# Patient Record
Sex: FEMALE | Race: BLACK OR AFRICAN AMERICAN | Employment: UNEMPLOYED | URBAN - METROPOLITAN AREA
[De-identification: names, ages, dates, MRNs, and addresses within clinical notes are randomized per-mention and may not be internally consistent; named-entity substitution may affect disease eponyms.]

---

## 2017-01-14 ENCOUNTER — HOSPITAL ENCOUNTER (EMERGENCY)
Facility: HOSPITAL | Age: 27
Discharge: HOME | End: 2017-01-14
Attending: EMERGENCY MEDICINE
Payer: MEDICAID

## 2017-01-14 VITALS
OXYGEN SATURATION: 99 % | DIASTOLIC BLOOD PRESSURE: 78 MMHG | RESPIRATION RATE: 20 BRPM | BODY MASS INDEX: 22.66 KG/M2 | WEIGHT: 141 LBS | SYSTOLIC BLOOD PRESSURE: 134 MMHG | TEMPERATURE: 99 F | HEART RATE: 104 BPM | HEIGHT: 66 IN

## 2017-01-14 DIAGNOSIS — B34.9 VIRAL SYNDROME: Primary | ICD-10-CM

## 2017-01-14 LAB
B-HCG UR QL: NEGATIVE
CTP QC/QA: YES
DEPRECATED S PYO AG THROAT QL EIA: NEGATIVE
FLUAV AG SPEC QL IA: NEGATIVE
FLUBV AG SPEC QL IA: NEGATIVE
SPECIMEN SOURCE: NORMAL

## 2017-01-14 PROCEDURE — 25000003 PHARM REV CODE 250: Performed by: EMERGENCY MEDICINE

## 2017-01-14 PROCEDURE — 99283 EMERGENCY DEPT VISIT LOW MDM: CPT | Mod: 25

## 2017-01-14 PROCEDURE — 81025 URINE PREGNANCY TEST: CPT

## 2017-01-14 PROCEDURE — 87400 INFLUENZA A/B EACH AG IA: CPT

## 2017-01-14 PROCEDURE — 87880 STREP A ASSAY W/OPTIC: CPT

## 2017-01-14 PROCEDURE — 87081 CULTURE SCREEN ONLY: CPT

## 2017-01-14 RX ORDER — IBUPROFEN 400 MG/1
400 TABLET ORAL
Status: COMPLETED | OUTPATIENT
Start: 2017-01-14 | End: 2017-01-14

## 2017-01-14 RX ADMIN — IBUPROFEN 400 MG: 400 TABLET, FILM COATED ORAL at 08:01

## 2017-01-14 NOTE — ED AVS SNAPSHOT
"          [facility]  [address]  [place] [address]               Delmy Garcia   2017  7:18 PM   ED    Description:  Female : 1990   Department:  [facility]           Your Care was Coordinated By:     Provider Role From To    Phillip Tejada Jr., MD Attending Provider [phone] --      Reason for Visit     Nausea     Sore Throat           Diagnoses this Visit        Comments    Viral syndrome    -  Primary       ED Disposition     None           To Do List           Follow-up Information     Please follow up.    Why:  Follow-up with the primary care physician or clinic of choice if not improved      [facility]     [facility] -  Assistance  Registered nurses in the [facility] provide clinical advisement, health education, appointment booking, and other advisory services.  Call for this free service at [phone].             Medications           Message regarding Medications     Verify the changes and/or additions to your medication regime listed below are the same as discussed with your clinician today.  If any of these changes or additions are incorrect, please notify your healthcare provider.        These medications were administered today        Dose Freq    ibuprofen tablet 400 mg 400 mg ED 1 Time    Sig: Take 1 tablet (400 mg total) by mouth ED 1 Time.    Class: Normal    Route: Oral           Verify that the below list of medications is an accurate representation of the medications you are currently taking.  If none reported, the list may be blank. If incorrect, please contact your healthcare provider. Carry this list with you in case of emergency.           Current Medications            Clinical Reference Information           Your Vitals Were     BP Pulse Temp Resp Height Weight    129/69 (Patient Position: Sitting) 99 99.9 °F (37.7 °C) (Oral) 20 5' 6" (1.676 m) 64 kg (141 lb)    Last " "Period SpO2 BMI          01/06/2017 (Approximate) 100% 22.76 kg/m2        Allergies as of 1/14/2017     No Known Allergies      Immunizations Administered on Date of Encounter - 1/14/2017     None      ED Micro, Lab, POCT     Start Ordered       Status Ordering Provider    01/14/17 2037 01/14/17 2036  Influenza antigen Nasal Swab  STAT      Final result     01/14/17 2037 01/14/17 2036  Rapid strep screen  STAT      Final result     01/14/17 2036 01/14/17 2036  Strep A culture, throat  Once      In process     01/14/17 0000 01/14/17 1927  POCT urine pregnancy     Comments:  This order was created through External Result Entry    Completed       ED Imaging Orders     None        Discharge Instructions         Viral Syndrome (Adult)  A viral illness may cause a number of symptoms. The symptoms depend on the part of the body that the virus affects. If it settles in your nose, throat, and lungs, it may cause cough, sore throat, congestion, and sometimes headache. If it settles in your stomach and intestinal tract, it may cause vomiting and diarrhea. Sometimes it causes vague symptoms like "aching all over," feeling tired, loss of appetite, or fever.  A viral illness usually lasts 1 to 2 weeks, but sometimes it lasts longer. In some cases, a more serious infection can look like a viral syndrome in the first few days of the illness. You may need another exam and additional tests to know the difference. Watch for the warning signs listed below.  Home care  Follow these guidelines for taking care of yourself at home:  · If symptoms are severe, rest at home for the first 2 to 3 days.  · Stay away from cigarette smoke - both your smoke and the smoke from others.  · You may use over-the-counter acetaminophen or ibuprofen for fever, muscle aching, and headache, unless another medicine was prescribed for this. If you have chronic liver or kidney disease or ever had a stomach ulcer or GI bleeding, talk with your doctor before " using these medicines. No one who is younger than 18 and ill with a fever should take aspirin. It may cause severe disease or death.  · Your appetite may be poor, so a light diet is fine. Avoid dehydration by drinking 8 to 12 8-ounce glasses of fluids each day. This may include water; orange juice; lemonade; apple, grape, and cranberry juice; clear fruit drinks; electrolyte replacement and sports drinks; and decaffeinated teas and coffee. If you have been diagnosed with a kidney disease, ask your doctor how much and what types of fluids you should drink to prevent dehydration. If you have kidney disease, drinking too much fluid can cause it build up in the your body and be dangerous to your health.  · Over-the-counter remedies won't shorten the length of the illness but may be helpful for cough, sore throat; and nasal and sinus congestion. Don't use decongestants if you have high blood pressure.  Follow-up care  Follow up with your healthcare provider if you do not improve over the next week.  Call 911  Get emergency medical care if any of the following occur:  · Convulsion  · Feeling weak, dizzy, or like you are going to faint  · Chest pain, shortness of breath, wheezing, or difficulty breathing  When to seek medical advice  Call your healthcare provider right away if any of these occur:  · Cough with lots of colored sputum (mucus) or blood in your sputum  · Chest pain, shortness of breath, wheezing, or difficulty breathing  · Severe headache; face, neck, or ear pain  · Severe, constant pain in the lower right side of your belly (abdominal)  · Continued vomiting (cant keep liquids down)  · Frequent diarrhea (more than 5 times a day); blood (red or black color) or mucus in diarrhea  · Feeling weak, dizzy, or like you are going to faint  · Extreme thirst  · Fever of 100.4°F (38°C) or higher, or as directed by your healthcare provider  © 8626-7200 [employer]. [address] [place] [address], [place] [address].  All rights reserved. This information is not intended as a substitute for professional medical care. Always follow your healthcare professional's instructions.          [facility] Sign-Up     Activating your [facility] account is as easy as 1-2-3!     1) Visit [email or web address], select Sign Up Now, enter this activation code and your date of birth, then select Next.  [email or web address]  Expires: 2/28/2017  9:35 PM      2) Create a username and password to use when you visit [facility] in the future and select a security question in case you lose your password and select Next.    3) Enter your e-mail address and click Sign Up!    Additional Information  If you have questions, please e-mail [email or web address] or call [phone] to talk to our [facility] staff. Remember, [facility] is NOT to be used for urgent needs. For medical emergencies, dial 911.          [facility] complies with applicable Federal civil rights laws and does not discriminate on the basis of race, color, national origin, age, disability, or sex.        Language Assistance Services     ATTENTION: Language assistance services are available, free of charge. Please call [phone].      ATENCIÓN: Si habla ashleigh, tiene a shaver disposición servicios gratuitos de asistencia lingüística. Llame al [phone].     CHÚ Ý: N?u b?n nói Ti?ng Vi?t, có các d?ch v? h? tr? ngôn ng? mi?n phí dành cho b?n. G?i s? [phone].

## 2017-01-15 NOTE — DISCHARGE INSTRUCTIONS
"  Viral Syndrome (Adult)  A viral illness may cause a number of symptoms. The symptoms depend on the part of the body that the virus affects. If it settles in your nose, throat, and lungs, it may cause cough, sore throat, congestion, and sometimes headache. If it settles in your stomach and intestinal tract, it may cause vomiting and diarrhea. Sometimes it causes vague symptoms like "aching all over," feeling tired, loss of appetite, or fever.  A viral illness usually lasts 1 to 2 weeks, but sometimes it lasts longer. In some cases, a more serious infection can look like a viral syndrome in the first few days of the illness. You may need another exam and additional tests to know the difference. Watch for the warning signs listed below.  Home care  Follow these guidelines for taking care of yourself at home:  · If symptoms are severe, rest at home for the first 2 to 3 days.  · Stay away from cigarette smoke - both your smoke and the smoke from others.  · You may use over-the-counter acetaminophen or ibuprofen for fever, muscle aching, and headache, unless another medicine was prescribed for this. If you have chronic liver or kidney disease or ever had a stomach ulcer or GI bleeding, talk with your doctor before using these medicines. No one who is younger than 18 and ill with a fever should take aspirin. It may cause severe disease or death.  · Your appetite may be poor, so a light diet is fine. Avoid dehydration by drinking 8 to 12 8-ounce glasses of fluids each day. This may include water; orange juice; lemonade; apple, grape, and cranberry juice; clear fruit drinks; electrolyte replacement and sports drinks; and decaffeinated teas and coffee. If you have been diagnosed with a kidney disease, ask your doctor how much and what types of fluids you should drink to prevent dehydration. If you have kidney disease, drinking too much fluid can cause it build up in the your body and be dangerous to your " health.  · Over-the-counter remedies won't shorten the length of the illness but may be helpful for cough, sore throat; and nasal and sinus congestion. Don't use decongestants if you have high blood pressure.  Follow-up care  Follow up with your healthcare provider if you do not improve over the next week.  Call 911  Get emergency medical care if any of the following occur:  · Convulsion  · Feeling weak, dizzy, or like you are going to faint  · Chest pain, shortness of breath, wheezing, or difficulty breathing  When to seek medical advice  Call your healthcare provider right away if any of these occur:  · Cough with lots of colored sputum (mucus) or blood in your sputum  · Chest pain, shortness of breath, wheezing, or difficulty breathing  · Severe headache; face, neck, or ear pain  · Severe, constant pain in the lower right side of your belly (abdominal)  · Continued vomiting (cant keep liquids down)  · Frequent diarrhea (more than 5 times a day); blood (red or black color) or mucus in diarrhea  · Feeling weak, dizzy, or like you are going to faint  · Extreme thirst  · Fever of 100.4°F (38°C) or higher, or as directed by your healthcare provider  © 1205-4874 [employer]. [address] [place] [address], [place] [address]. All rights reserved. This information is not intended as a substitute for professional medical care. Always follow your healthcare professional's instructions.

## 2017-01-15 NOTE — ED PROVIDER NOTES
Encounter Date: 1/14/2017       History     Chief Complaint   Patient presents with    Nausea     pt ambulatory to triage and reports body aches and feels warm and has sore throat and nausea; pt reports took some theraflu earlier today    Sore Throat     Review of patient's allergies indicates:  No Known Allergies  HPI Comments: Delmy Garcia, a 26 y.o. female, complains of otherwise body aches sore throat and subjective fever that began early this morning.  She said that she vomited one time complains of mild congestion.  She said she took calls and TheraFlu without improvement.  Pain location: Body aches and sore throat  Pain Severity: Mild-to-moderate    Pain timing: Less than 24 hours  Pain character: Aching muscles, stinging throat  Associated with or Modified by: (see above)        History reviewed. No pertinent past medical history.  No past medical history pertinent negatives.  History reviewed. No pertinent past surgical history.  No family history on file.  Social History   Substance Use Topics    Smoking status: Never Smoker    Smokeless tobacco: None    Alcohol use None     Review of Systems   HENT: Positive for congestion and sore throat.    Respiratory: Positive for cough.    Musculoskeletal: Positive for myalgias.   All other systems reviewed and are negative.      Physical Exam   Initial Vitals   BP Pulse Resp Temp SpO2   01/14/17 1916 01/14/17 1916 01/14/17 1916 01/14/17 1916 01/14/17 1916   129/69 99 20 99.9 °F (37.7 °C) 100 %     Physical Exam    Nursing note and vitals reviewed.  Constitutional: She appears well-developed and well-nourished. No distress.   HENT:   Right Ear: External ear normal.   Left Ear: External ear normal.   Tympanic membranes are normal bilaterally.  The pharynx is mildly injected but there are no exudates.  The airway is widely patent.   Eyes: Conjunctivae and EOM are normal. Pupils are equal, round, and reactive to light.   Neck: Normal range of motion. Neck  supple.   Cardiovascular: Normal rate, regular rhythm and normal heart sounds.   Pulmonary/Chest: Breath sounds normal.   Abdominal: Soft.   Musculoskeletal: Normal range of motion.   Neurological: She is alert and oriented to person, place, and time.   Skin: Skin is warm.   Psychiatric: She has a normal mood and affect. Her behavior is normal. Thought content normal.         ED Course   Procedures  Labs Reviewed   THROAT SCREEN, RAPID   CULTURE, STREP A,  THROAT   INFLUENZA A AND B ANTIGEN             Medical Decision Making:   Initial Assessment:   26 y.o. female, complains of otherwise body aches sore throat and subjective fever that began early this morning.  She said that she vomited one time complains of mild congestion.  She said she took calls and TheraFlu without improvement.  Pain location: Body aches and sore throat  Pain Severity: Mild-to-moderate    Pain timing: Less than 24 hours    PE: Unremarkable physical examination  ED Management:  Flu and strep studies were negative.  The patient will be treated with over-the-counter medication for nonspecific URI/viral syndrome.  She may follow-up with her primary care physician or clinic of choice if not improved                   ED Course     Clinical Impression:   The encounter diagnosis was Viral syndrome.          Phillip Tejada Jr., MD  01/14/17 2136       Phillip Tejada Jr., MD  01/14/17 2137

## 2017-01-15 NOTE — ED TRIAGE NOTES
Pt presents to ED c/o body aches, itchy throat, nausea, and chills x 1 day. Started this am. Pt took theraflu approx 6 hours ago with no relief. Pt states she attempted to eat chicken noodle soup but vomited right after. No urinary complaints. No diarrhea. Pt does reports dry cough.

## 2017-01-17 LAB — BACTERIA THROAT CULT: NORMAL

## 2019-02-19 ENCOUNTER — HOSPITAL ENCOUNTER (EMERGENCY)
Facility: HOSPITAL | Age: 29
Discharge: HOME | End: 2019-02-19
Attending: EMERGENCY MEDICINE
Payer: COMMERCIAL

## 2019-02-19 VITALS
BODY MASS INDEX: 21.53 KG/M2 | WEIGHT: 134 LBS | DIASTOLIC BLOOD PRESSURE: 87 MMHG | RESPIRATION RATE: 18 BRPM | HEART RATE: 74 BPM | HEIGHT: 66 IN | OXYGEN SATURATION: 100 % | TEMPERATURE: 98 F | SYSTOLIC BLOOD PRESSURE: 135 MMHG

## 2019-02-19 DIAGNOSIS — S39.012A BACK STRAIN, INITIAL ENCOUNTER: Primary | ICD-10-CM
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
## 2019-02-19 LAB
B-HCG UR QL: NEGATIVE
CTP QC/QA: YES

## 2019-02-19 PROCEDURE — 81025 URINE PREGNANCY TEST: CPT | Performed by: PHYSICIAN ASSISTANT

## 2019-02-19 PROCEDURE — 99283 EMERGENCY DEPT VISIT LOW MDM: CPT

## 2019-02-19 RX ORDER — METHOCARBAMOL 750 MG/1
1500 TABLET, FILM COATED ORAL 3 TIMES DAILY
Qty: 30 TABLET | Refills: 0 | Status: SHIPPED | OUTPATIENT
Start: 2019-02-19 | End: 2019-02-24

## 2019-02-19 RX ORDER — KETOROLAC TROMETHAMINE 10 MG/1
10 TABLET, FILM COATED ORAL EVERY 6 HOURS
Qty: 12 TABLET | Refills: 0 | Status: SHIPPED | OUTPATIENT
Start: 2019-02-19 | End: 2019-02-22
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
## 2020-02-18 ENCOUNTER — DOCUMENTATION ONLY (OUTPATIENT)
Dept: URGENT CARE | Facility: CLINIC | Age: 30
End: 2020-02-18

## 2020-02-18 ENCOUNTER — OFFICE VISIT (OUTPATIENT)
Dept: URGENT CARE | Facility: CLINIC | Age: 30
End: 2020-02-18
Payer: COMMERCIAL

## 2020-02-18 VITALS
WEIGHT: 134.06 LBS | SYSTOLIC BLOOD PRESSURE: 144 MMHG | HEIGHT: 66 IN | BODY MASS INDEX: 21.55 KG/M2 | TEMPERATURE: 99 F | OXYGEN SATURATION: 100 % | RESPIRATION RATE: 18 BRPM | DIASTOLIC BLOOD PRESSURE: 82 MMHG

## 2020-02-18 DIAGNOSIS — M25.561 ACUTE PAIN OF RIGHT KNEE: ICD-10-CM

## 2020-02-18 DIAGNOSIS — S80.01XA CONTUSION OF RIGHT KNEE, INITIAL ENCOUNTER: ICD-10-CM
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
## 2020-02-18 PROCEDURE — 73562 X-RAY EXAM OF KNEE 3: CPT | Mod: RT,S$GLB,, | Performed by: RADIOLOGY

## 2020-02-18 PROCEDURE — 73562 XR KNEE 3 VIEW RIGHT: ICD-10-PCS | Mod: RT,S$GLB,, | Performed by: RADIOLOGY

## 2020-02-18 PROCEDURE — 99204 OFFICE O/P NEW MOD 45 MIN: CPT | Mod: S$GLB,,, | Performed by: NURSE PRACTITIONER

## 2020-02-18 PROCEDURE — 99204 PR OFFICE/OUTPT VISIT, NEW, LEVL IV, 45-59 MIN: ICD-10-PCS | Mod: S$GLB,,, | Performed by: NURSE PRACTITIONER

## 2020-02-18 RX ORDER — DEXTROMETHORPHAN HYDROBROMIDE, GUAIFENESIN 5; 100 MG/5ML; MG/5ML
650 LIQUID ORAL EVERY 8 HOURS
Refills: 0 | COMMUNITY
Start: 2020-02-18 | End: 2020-03-10 | Stop reason: SDUPTHER

## 2020-02-18 RX ORDER — IBUPROFEN 200 MG
400 TABLET ORAL EVERY 6 HOURS PRN
Refills: 0 | COMMUNITY
Start: 2020-02-18 | End: 2020-03-17 | Stop reason: ALTCHOICE

## 2020-02-18 NOTE — LETTER
[facility] [address] [address]  Phone: [phone]  Fax: [phone]  [facility]: [email or web address]    Pt Name: Delmy Garcia  Injury Date: 02/13/2020    Date of First Treatment: 02/18/2020   Company: [employer]      Appointment Time: 10:20 AM Arrived: 1030am   Provider: Norma King NP Time Out:1215pm     Office Treatment:   1. Work related injury    2. Acute pain of right knee    3. Contusion of right knee, initial encounter      Medications Ordered This Encounter   Medications    acetaminophen (TYLENOL) 650 MG TbSR    ibuprofen (ADVIL,MOTRIN) 200 MG tablet      Patient Instructions: Attention not to aggravate affected area, Daily home exercises/warm soaks, Apply ice 24-48 hours then apply heat/warm soaks, Elevated affected area(Please take Tylenol or ibuprofen as directed for pain and discomfort on the bottle)    Restrictions: Sit down work only, No driving company vehicles(02/18/2020)     Return Appointment: 02/24/2020 at 10am

## 2020-02-18 NOTE — LETTER
[facility] [address] [address]  Phone: [phone]  Fax: [phone]  [facility]: [email or web address]    Pt Name: Delmy Garcia  Injury Date: 02/13/2020   Employee ID:[id number] Date of Treatment: 02/18/2020   Company: [employer]      Appointment Time: 10:20 AM Arrived: 10:30 AM   Provider: Norma King NP Time Out:12:15 PM     Office Treatment:   1. Work related injury    2. Acute pain of right knee    3. Contusion of right knee, initial encounter      Medications Ordered This Encounter   Medications    acetaminophen (TYLENOL) 650 MG TbSR    ibuprofen (ADVIL,MOTRIN) 200 MG tablet      Patient Instructions: Attention not to aggravate affected area, Daily home exercises/warm soaks, Apply ice 24-48 hours then apply heat/warm soaks, Elevated affected area(Please take Tylenol or ibuprofen as directed for pain and discomfort on the bottle)    Restrictions: Sit down work only, No driving company vehicles(02/18/2020)     Return Appointment: 2/24/2020 at 10:00 AM

## 2020-02-18 NOTE — PROGRESS NOTES
Subjective:       Patient ID: Delmy Garcia is a 29 y.o. female.    Chief Complaint: Work Related Injury    Patient here today with c/o while at work on Thursday 2/13/20 she was getting out of a car and felt Right knee pain. States it felt better after resting it over the weekend but became worse again yesterday after walking around while at work all day.    Knee Injury   This is a new problem. The current episode started in the past 7 days. The problem occurs intermittently. The problem has been unchanged. Associated symptoms include arthralgias and joint swelling. Pertinent negatives include no abdominal pain, anorexia, change in bowel habit, chest pain, chills, congestion, coughing, fatigue, fever, headaches, myalgias, nausea, neck pain, numbness, rash, sore throat, swollen glands, urinary symptoms, vertigo, visual change, vomiting or weakness. The symptoms are aggravated by walking. She has tried nothing for the symptoms. The treatment provided no relief.       Constitution: Negative for chills, fatigue and fever.   HENT: Negative for facial swelling, facial trauma, congestion and sore throat.    Neck: Negative for neck pain and neck stiffness.   Cardiovascular: Negative for chest trauma and chest pain.   Eyes: Negative for eye trauma, double vision and blurred vision.   Respiratory: Negative for cough.    Gastrointestinal: Negative for abdominal trauma, abdominal pain, nausea, vomiting and rectal bleeding.   Genitourinary: Negative for hematuria, missed menses, genital trauma and pelvic pain.   Musculoskeletal: Positive for joint pain and joint swelling. Negative for pain, trauma, abnormal ROM of joint and muscle ache.   Skin: Negative for color change, rash, wound, abrasion, laceration, erythema and bruising.   Neurological: Negative for dizziness, history of vertigo, light-headedness, coordination disturbances, headaches, altered mental status, loss of consciousness and numbness.   Hematologic/Lymphatic:  Negative for history of bleeding disorder.   Psychiatric/Behavioral: Negative for altered mental status.        Objective:      Physical Exam   Constitutional: She is oriented to person, place, and time. She appears well-developed and well-nourished.  Non-toxic appearance. She does not have a sickly appearance. She does not appear ill. No distress.   Elevated BP in the clinic   HENT:   Head: Normocephalic and atraumatic. Head is without abrasion, without contusion and without laceration.   Right Ear: External ear normal.   Left Ear: External ear normal.   Nose: Nose normal.   Eyes: Pupils are equal, round, and reactive to light. Conjunctivae, EOM and lids are normal.   Neck: Trachea normal, full passive range of motion without pain and phonation normal. Neck supple.   Cardiovascular: Normal rate and intact distal pulses.   Pulses:       Dorsalis pedis pulses are 2+ on the right side.        Posterior tibial pulses are 2+ on the right side.   Pulmonary/Chest: Effort normal and breath sounds normal. No stridor. No respiratory distress.   Musculoskeletal: Normal range of motion. She exhibits tenderness.        Right knee: She exhibits swelling and abnormal patellar mobility. She exhibits normal range of motion. Tenderness found.        Legs:  Neurological: She is alert and oriented to person, place, and time.   Skin: Skin is warm, dry and intact. Capillary refill takes less than 2 seconds. No abrasion, no bruising, no burn, no ecchymosis, no laceration, no lesion and no rash noted. No erythema.   Psychiatric: She has a normal mood and affect. Her speech is normal and behavior is normal. Judgment and thought content normal. Cognition and memory are normal.   Nursing note and vitals reviewed.    Xr Knee 3 View Right    Result Date: 2/18/2020  EXAMINATION: XR KNEE 3 VIEW RIGHT CLINICAL HISTORY: Pain in right knee FINDINGS: Three views right: No fracture, dislocation bone destruction or OCD seen. Electronically signed  by: Eze Herrera MD Date:    02/18/2020 Time:    11:57  Assessment:       1. Work related injury    2. Acute pain of right knee    3. Contusion of right knee, initial encounter        Plan:         Medications Ordered This Encounter   Medications    acetaminophen (TYLENOL) 650 MG TbSR     Sig: Take 1 tablet (650 mg total) by mouth every 8 (eight) hours.     Refill:  0    ibuprofen (ADVIL,MOTRIN) 200 MG tablet     Sig: Take 2 tablets (400 mg total) by mouth every 6 (six) hours as needed for Pain.     Refill:  0     Patient Instructions: Attention not to aggravate affected area, Daily home exercises/warm soaks, Apply ice 24-48 hours then apply heat/warm soaks, Elevated affected area(Please take Tylenol or ibuprofen as directed for pain and discomfort on the bottle)   Restrictions: Sit down work only, No driving company vehicles(02/18/2020)  Follow up in about 6 days (around 2/24/2020).

## 2020-02-24 ENCOUNTER — OFFICE VISIT (OUTPATIENT)
Dept: URGENT CARE | Facility: CLINIC | Age: 30
End: 2020-02-24
Payer: COMMERCIAL

## 2020-02-24 VITALS
BODY MASS INDEX: 21.62 KG/M2 | HEIGHT: 66 IN | SYSTOLIC BLOOD PRESSURE: 125 MMHG | WEIGHT: 134.5 LBS | DIASTOLIC BLOOD PRESSURE: 93 MMHG | OXYGEN SATURATION: 100 % | RESPIRATION RATE: 20 BRPM | TEMPERATURE: 98 F | HEART RATE: 60 BPM

## 2020-02-24 DIAGNOSIS — M25.561 ACUTE PAIN OF RIGHT KNEE: Primary | ICD-10-CM
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
## 2020-02-24 DIAGNOSIS — S80.01XA CONTUSION OF RIGHT KNEE, INITIAL ENCOUNTER: ICD-10-CM

## 2020-02-24 PROCEDURE — 99214 OFFICE O/P EST MOD 30 MIN: CPT | Mod: S$GLB,,, | Performed by: NURSE PRACTITIONER

## 2020-02-24 PROCEDURE — 99214 PR OFFICE/OUTPT VISIT, EST, LEVL IV, 30-39 MIN: ICD-10-PCS | Mod: S$GLB,,, | Performed by: NURSE PRACTITIONER

## 2020-02-24 NOTE — LETTER
[facility] [address] [address]  Phone: [phone]  Fax: [phone]  [facility]: [id number]    Pt Name: Delmy Garcia  Injury Date: 02/13/2020   Employee ID: [id number] Date of  Treatment: 02/24/2020   Company: [employer]      Appointment Time: 09:45 AM Arrived: 10:00am   Provider: KRISTI Gan Time Out:11:05am     Office Treatment:   1. Acute pain of right knee    2. Contusion of right knee, initial encounter    3. Work related injury          Patient Instructions: Attention not to aggravate affected area, Elevated affected area    Restrictions: Sit down work only     Return Appointment: 3/2/2020 at 11:00am

## 2020-02-24 NOTE — PROGRESS NOTES
"Subjective:       Patient ID: Delmy Garcia is a 29 y.o. female.    Vitals:  height is 5' 6" (1.676 m) and weight is 61 kg (134 lb 7.7 oz). Her temperature is 97.9 °F (36.6 °C). Her blood pressure is 125/93 (abnormal) and her pulse is 60. Her respiration is 20 and oxygen saturation is 100%.     Chief Complaint: Work Related Injury    Ambulatory f/u right knee injury. Patient states she was getting out of a vehicle and hit her right knee.she went back to work and her knee kept swelling. She has not been back to work since then.. She says she is still experiencing pain when walking to knee but swelling has improved.     Knee Injury   This is a new problem. The current episode started 1 to 4 weeks ago. The problem occurs constantly. The problem has been gradually improving. Pertinent negatives include no arthralgias, chest pain, chills, congestion, coughing, fatigue, fever, headaches, joint swelling, myalgias, nausea, rash, sore throat, vertigo, vomiting or weakness. Nothing aggravates the symptoms. She has tried NSAIDs and immobilization for the symptoms.       Constitution: Negative for chills, fatigue and fever.   HENT: Negative for congestion and sore throat.    Neck: Negative for painful lymph nodes.   Cardiovascular: Negative for chest pain and leg swelling.   Eyes: Negative for double vision and blurred vision.   Respiratory: Negative for cough and shortness of breath.    Gastrointestinal: Negative for nausea, vomiting and diarrhea.   Genitourinary: Negative for dysuria, frequency, urgency and history of kidney stones.   Musculoskeletal: Negative for joint pain, joint swelling, muscle cramps and muscle ache.   Skin: Negative for color change, pale, rash, erythema and bruising.   Allergic/Immunologic: Negative for seasonal allergies.   Neurological: Negative for dizziness, history of vertigo, light-headedness, passing out and headaches.   Hematologic/Lymphatic: Negative for swollen lymph nodes. "   Psychiatric/Behavioral: Negative for nervous/anxious, sleep disturbance and depression. The patient is not nervous/anxious.        Objective:      Physical Exam   Constitutional: She is oriented to person, place, and time. She appears well-developed and well-nourished.  Non-toxic appearance. She does not have a sickly appearance. She does not appear ill. No distress.   Elevated BP in the clinic   HENT:   Head: Normocephalic and atraumatic. Head is without abrasion, without contusion and without laceration.   Right Ear: External ear normal.   Left Ear: External ear normal.   Nose: Nose normal.   Eyes: Pupils are equal, round, and reactive to light. Conjunctivae, EOM and lids are normal.   Neck: Trachea normal, full passive range of motion without pain and phonation normal. Neck supple.   Cardiovascular: Normal rate and intact distal pulses.   Pulses:       Dorsalis pedis pulses are 2+ on the right side.        Posterior tibial pulses are 2+ on the right side.   Pulmonary/Chest: Effort normal and breath sounds normal. No stridor. No respiratory distress.   Musculoskeletal: Normal range of motion. She exhibits tenderness.        Right knee: She exhibits swelling. She exhibits normal range of motion and normal patellar mobility. Tenderness found.        Legs:  Neurological: She is alert and oriented to person, place, and time.   Skin: Skin is warm, dry, intact and no rash. Capillary refill takes less than 2 seconds. abrasion, burn, bruising, erythema, ecchymosis and lesion  Psychiatric: She has a normal mood and affect. Her speech is normal and behavior is normal. Judgment and thought content normal. Cognition and memory are normal.   Nursing note and vitals reviewed.        Assessment:       1. Acute pain of right knee    2. Contusion of right knee, initial encounter    3. Work related injury        Plan:         Acute pain of right knee  -     BANDAGE ELASTIC 4IN ACE NS    Contusion of right knee, initial  encounter  -     BANDAGE ELASTIC 4IN ACE NS    Work related injury    follow up 3/2/2020  elevate extremity; ice to area, ace wrap  Light duty- sit down work only

## 2020-02-24 NOTE — LETTER
[facility] [address] [address]  Phone: [phone]  Fax: [phone]  [facility]: [id number]    Pt Name: Delmy Garcia  Injury Date: 02/13/2020   Employee ID: [id number] Date of First Treatment: 02/24/2020   Company: [employer]      Appointment Time: 09:45 AM Arrived: 1000   Provider: KRISTI Gan Time Out:1105     Office Treatment:   1. Acute pain of right knee    2. Contusion of right knee, initial encounter    3. Work related injury          Patient Instructions: Attention not to aggravate affected area, Elevated affected area    Restrictions: Sit down work only     Return Appointment: Visit date 03/02/2020 at 1100 am

## 2020-03-02 ENCOUNTER — OFFICE VISIT (OUTPATIENT)
Dept: URGENT CARE | Facility: CLINIC | Age: 30
End: 2020-03-02
Payer: COMMERCIAL

## 2020-03-02 VITALS
HEIGHT: 66 IN | RESPIRATION RATE: 16 BRPM | OXYGEN SATURATION: 100 % | WEIGHT: 134 LBS | BODY MASS INDEX: 21.53 KG/M2 | DIASTOLIC BLOOD PRESSURE: 74 MMHG | HEART RATE: 60 BPM | SYSTOLIC BLOOD PRESSURE: 119 MMHG
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
## 2020-03-02 DIAGNOSIS — S83.411D SPRAIN OF MEDIAL COLLATERAL LIGAMENT OF RIGHT KNEE, SUBSEQUENT ENCOUNTER: ICD-10-CM

## 2020-03-02 DIAGNOSIS — M25.561 ACUTE PAIN OF RIGHT KNEE: ICD-10-CM

## 2020-03-02 DIAGNOSIS — S80.01XD CONTUSION OF RIGHT KNEE, SUBSEQUENT ENCOUNTER: ICD-10-CM

## 2020-03-02 PROCEDURE — 99214 PR OFFICE/OUTPT VISIT, EST, LEVL IV, 30-39 MIN: ICD-10-PCS | Mod: S$GLB,,, | Performed by: NURSE PRACTITIONER

## 2020-03-02 PROCEDURE — 99214 OFFICE O/P EST MOD 30 MIN: CPT | Mod: S$GLB,,, | Performed by: NURSE PRACTITIONER

## 2020-03-02 RX ORDER — METHOCARBAMOL 750 MG/1
750 TABLET, FILM COATED ORAL 4 TIMES DAILY PRN
Qty: 28 TABLET | Refills: 0 | Status: SHIPPED | OUTPATIENT
Start: 2020-03-02 | End: 2020-03-09

## 2020-03-02 RX ORDER — ETODOLAC 400 MG/1
400 TABLET, FILM COATED ORAL 2 TIMES DAILY
Qty: 30 TABLET | Refills: 0 | Status: SHIPPED | OUTPATIENT
Start: 2020-03-02 | End: 2020-03-17

## 2020-03-02 NOTE — LETTER
[facility] [address] [address]  Phone: [phone]  Fax: [phone]  [facility]: [email or web address]    Pt Name: Delmy Garcia  Injury Date: 02/13/2020   Employee ID: [id number] Date of  Treatment: 03/02/2020   Company: [employer]      Appointment Time: 10:45 AM Arrived: 10:37 am    Provider: Norma King NP Time Out: 12:00pm     Office Treatment:   1. Work related injury    2. Sprain of medial collateral ligament of right knee, subsequent encounter    3. Contusion of right knee, subsequent encounter    4. Acute pain of right knee      Medications Ordered This Encounter   Medications    etodolac (LODINE) 400 MG tablet    methocarbamol (ROBAXIN) 750 MG Tab      Patient Instructions: Attention not to aggravate affected area, Apply ice 24-48 hours then apply heat/warm soaks, Elevated affected area, MRI to be scheduled once authorized, Use splint as directed(Please take medication as directed for pain and discomfort)    Restrictions: Sit down work only, No driving company vehicles(03/02/2020)     Return Appointment: 3/10/2020 at 8:15am

## 2020-03-02 NOTE — LETTER
[facility] [address] [address]  Phone: [phone]  Fax: [phone]  [facility]: [email or web address]    Pt Name: Delmy Garcia  Injury Date: 02/13/2020    Date of First Treatment: 03/02/2020   Company: [employer]      Appointment Time: 10:45 AM Arrived: 1037am   Provider: Norma King NP Time Out:12pm     Office Treatment:   1. Work related injury    2. Sprain of medial collateral ligament of right knee, subsequent encounter    3. Contusion of right knee, subsequent encounter    4. Acute pain of right knee      Medications Ordered This Encounter   Medications    etodolac (LODINE) 400 MG tablet    methocarbamol (ROBAXIN) 750 MG Tab      Patient Instructions: Attention not to aggravate affected area, Apply ice 24-48 hours then apply heat/warm soaks, Elevated affected area, MRI to be scheduled once authorized, Use splint as directed(Please take medication as directed for pain and discomfort)    Restrictions: Sit down work only, No driving company vehicles(03/02/2020)     Return Appointment: 03/10/2020 at 815am

## 2020-03-02 NOTE — PROGRESS NOTES
Subjective:       Patient ID: Delmy Garcia is a 29 y.o. female.    Chief Complaint: Knee Injury    Patient states that the knee is still hurting and swelling.  States that it keeps popping.  States that Tylenol and ibuprofen are not working.    Knee Injury   This is a new problem. The current episode started 1 to 4 weeks ago (x3 wks ). The problem occurs constantly. The problem has been unchanged. Associated symptoms include arthralgias and joint swelling. Pertinent negatives include no abdominal pain, fatigue, vertigo or weakness. The symptoms are aggravated by bending, walking, standing and twisting. She has tried ice, acetaminophen, NSAIDs and rest (elevation ) for the symptoms. The treatment provided no relief.       Constitution: Negative for fatigue.   HENT: Negative for facial swelling and facial trauma.    Neck: Negative for neck stiffness.   Cardiovascular: Negative for chest trauma.   Eyes: Negative for eye trauma, double vision and blurred vision.   Gastrointestinal: Negative for abdominal trauma, abdominal pain and rectal bleeding.   Genitourinary: Negative for hematuria, missed menses, genital trauma and pelvic pain.   Musculoskeletal: Positive for pain, trauma, joint pain and joint swelling. Negative for abnormal ROM of joint.   Skin: Negative for color change, wound, abrasion, laceration, erythema and bruising.   Neurological: Negative for dizziness, history of vertigo, light-headedness, coordination disturbances, altered mental status and loss of consciousness.   Hematologic/Lymphatic: Negative for history of bleeding disorder.   Psychiatric/Behavioral: Negative for altered mental status.        Objective:      Physical Exam   Constitutional: She is oriented to person, place, and time. She appears well-developed and well-nourished.  Non-toxic appearance. She does not have a sickly appearance. She does not appear ill. No distress.   Elevated BP in the clinic   HENT:   Head: Normocephalic and  atraumatic. Head is without abrasion, without contusion and without laceration.   Right Ear: External ear normal.   Left Ear: External ear normal.   Nose: Nose normal.   Eyes: Pupils are equal, round, and reactive to light. Conjunctivae, EOM and lids are normal.   Neck: Trachea normal, full passive range of motion without pain and phonation normal. Neck supple.   Cardiovascular: Normal rate and intact distal pulses.   Pulses:       Dorsalis pedis pulses are 2+ on the right side.        Posterior tibial pulses are 2+ on the right side.   Pulmonary/Chest: Effort normal and breath sounds normal. No stridor. No respiratory distress.   Musculoskeletal: Normal range of motion. She exhibits tenderness.        Right knee: She exhibits swelling. She exhibits normal range of motion and normal patellar mobility. Tenderness found.        Legs:  Patient is unable to fully extend the leg secondary to pain with extension.  Pain with flexion starts at 90°.  Patient is noted to be favoring the right knee and bearing most of her weight on the left leg.  Antalgic gait noted.   Neurological: She is alert and oriented to person, place, and time.   Skin: Skin is warm, dry and intact. Capillary refill takes less than 2 seconds. No abrasion, no bruising, no burn, no ecchymosis, no lesion and no rash noted. No erythema.   Psychiatric: She has a normal mood and affect. Her speech is normal and behavior is normal. Judgment and thought content normal. Cognition and memory are normal.   Nursing note and vitals reviewed.      Assessment:       1. Work related injury    2. Sprain of medial collateral ligament of right knee, subsequent encounter    3. Contusion of right knee, subsequent encounter    4. Acute pain of right knee        Plan:         Medications Ordered This Encounter   Medications    etodolac (LODINE) 400 MG tablet     Sig: Take 1 tablet (400 mg total) by mouth 2 (two) times daily. for 15 days     Dispense:  30 tablet     Refill:   0    methocarbamol (ROBAXIN) 750 MG Tab     Sig: Take 1 tablet (750 mg total) by mouth 4 (four) times daily as needed (pain).     Dispense:  28 tablet     Refill:  0     Patient Instructions: Attention not to aggravate affected area, Apply ice 24-48 hours then apply heat/warm soaks, Elevated affected area, MRI to be scheduled once authorized, Use splint as directed(Please take medication as directed for pain and discomfort)   Restrictions: Sit down work only, No driving company vehicles(03/02/2020)  Follow up in about 8 days (around 3/10/2020).

## 2020-03-10 ENCOUNTER — OFFICE VISIT (OUTPATIENT)
Dept: URGENT CARE | Facility: CLINIC | Age: 30
End: 2020-03-10
Payer: COMMERCIAL

## 2020-03-10 VITALS
DIASTOLIC BLOOD PRESSURE: 85 MMHG | HEART RATE: 78 BPM | WEIGHT: 134 LBS | BODY MASS INDEX: 21.53 KG/M2 | HEIGHT: 66 IN | RESPIRATION RATE: 16 BRPM | OXYGEN SATURATION: 100 % | SYSTOLIC BLOOD PRESSURE: 115 MMHG
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
## 2020-03-10 DIAGNOSIS — S83.411D SPRAIN OF MEDIAL COLLATERAL LIGAMENT OF RIGHT KNEE, SUBSEQUENT ENCOUNTER: ICD-10-CM

## 2020-03-10 DIAGNOSIS — S80.01XD CONTUSION OF RIGHT KNEE, SUBSEQUENT ENCOUNTER: ICD-10-CM

## 2020-03-10 PROCEDURE — 99214 OFFICE O/P EST MOD 30 MIN: CPT | Mod: S$GLB,,, | Performed by: NURSE PRACTITIONER

## 2020-03-10 PROCEDURE — 99214 PR OFFICE/OUTPT VISIT, EST, LEVL IV, 30-39 MIN: ICD-10-PCS | Mod: S$GLB,,, | Performed by: NURSE PRACTITIONER

## 2020-03-10 RX ORDER — DEXTROMETHORPHAN HYDROBROMIDE, GUAIFENESIN 5; 100 MG/5ML; MG/5ML
650 LIQUID ORAL EVERY 8 HOURS
Refills: 0 | COMMUNITY
Start: 2020-03-10

## 2020-03-10 NOTE — LETTER
[facility] [address] [address]  Phone: [phone]  Fax: [phone]  [facility]: [email or web address]    Pt Name: Delmy Garcia  Injury Date: 02/13/2020   Employee ID: [id number] Date of  Treatment: 03/10/2020   Company: [employer]      Appointment Time: 10:45 AM Arrived: 11:00am   Provider: Norma King NP Time Out: 11:55am     Office Treatment:   1. Work related injury    2. Sprain of medial collateral ligament of right knee, subsequent encounter    3. Contusion of right knee, subsequent encounter      Medications Ordered This Encounter   Medications    acetaminophen (TYLENOL) 650 MG TbSR      Patient Instructions: Attention not to aggravate affected area, Daily home exercises/warm soaks, Apply ice 24-48 hours then apply heat/warm soaks, Elevated affected area, Use splint as directed(Please go to your MRI which is scheduled on 03/12/2020; please take medications as directed for pain and discomfort.)    Restrictions: Sit down work only(03/10/2020)     Return Appointment: 3/17/2020 at 11:00am

## 2020-03-10 NOTE — LETTER
[facility] [address] [address]  Phone: [phone]  Fax: [phone]  [facility]: [email or web address]    Pt Name: eDlmy Garcia  Injury Date: 02/13/2020    Date of First Treatment: 03/10/2020   Company: [employer]      Appointment Time: 10:45 AM Arrived: 11am   Provider: Norma King NP Time Out:1155am     Office Treatment:   1. Work related injury    2. Sprain of medial collateral ligament of right knee, subsequent encounter    3. Contusion of right knee, subsequent encounter      Medications Ordered This Encounter   Medications    acetaminophen (TYLENOL) 650 MG TbSR      Patient Instructions: Attention not to aggravate affected area, Daily home exercises/warm soaks, Apply ice 24-48 hours then apply heat/warm soaks, Elevated affected area, Use splint as directed(Please go to your MRI which is scheduled on 03/12/2020; please take medications as directed for pain and discomfort.)    Restrictions: Sit down work only(03/10/2020)     Return Appointment: 03/17/2020 at 11am

## 2020-03-10 NOTE — PROGRESS NOTES
Subjective:       Patient ID: Delmy Garcia is a 29 y.o. female.    Chief Complaint: Knee Injury    Patient states the brace is helping but symptoms have essentially remained the same.  She is scheduled for MRI on 03/12/2020    Knee Injury   This is a new problem. The current episode started more than 1 month ago. The problem occurs intermittently. The problem has been gradually improving. Associated symptoms include arthralgias and joint swelling. Pertinent negatives include no abdominal pain, fatigue, vertigo or weakness. The symptoms are aggravated by bending, exertion, walking, standing and twisting (extending ). She has tried ice, NSAIDs and immobilization for the symptoms. The treatment provided moderate (script meds ) relief.       Constitution: Negative for fatigue.   HENT: Negative for facial swelling and facial trauma.    Neck: Negative for neck stiffness.   Cardiovascular: Negative for chest trauma.   Eyes: Negative for eye trauma, double vision and blurred vision.   Gastrointestinal: Negative for abdominal trauma, abdominal pain and rectal bleeding.   Genitourinary: Negative for hematuria, missed menses, genital trauma and pelvic pain.   Musculoskeletal: Positive for pain, joint pain and joint swelling. Negative for trauma and abnormal ROM of joint.   Skin: Negative for color change, wound, abrasion, laceration, erythema and bruising.   Neurological: Negative for dizziness, history of vertigo, light-headedness, coordination disturbances, altered mental status and loss of consciousness.   Hematologic/Lymphatic: Negative for history of bleeding disorder.   Psychiatric/Behavioral: Negative for altered mental status.        Objective:      Physical Exam   Constitutional: She is oriented to person, place, and time. She appears well-developed and well-nourished.  Non-toxic appearance. She does not have a sickly appearance. She does not appear ill. No distress.   HENT:   Head: Normocephalic and atraumatic.  Head is without abrasion, without contusion and without laceration.   Right Ear: External ear normal.   Left Ear: External ear normal.   Nose: Nose normal.   Eyes: Pupils are equal, round, and reactive to light. Conjunctivae, EOM and lids are normal.   Neck: Trachea normal, full passive range of motion without pain and phonation normal. Neck supple.   Cardiovascular: Normal rate and intact distal pulses.   Pulses:       Dorsalis pedis pulses are 2+ on the right side.        Posterior tibial pulses are 2+ on the right side.   Pulmonary/Chest: Effort normal and breath sounds normal. No stridor. No respiratory distress.   Musculoskeletal: Normal range of motion. She exhibits tenderness.        Right knee: She exhibits swelling. She exhibits normal range of motion and normal patellar mobility. Tenderness found.        Legs:  Patient is unable to fully extend the leg secondary to pain with extension.  Pain with flexion starts at 90°.  Patient is noted to be favoring the right knee and bearing most of her weight on the left leg.  Antalgic gait noted.  There has been no change in assessment over previous visit   Neurological: She is alert and oriented to person, place, and time.   Skin: Skin is warm, dry and intact. Capillary refill takes less than 2 seconds. No abrasion, no bruising, no burn, no ecchymosis, no lesion and no rash noted. No erythema.   Psychiatric: She has a normal mood and affect. Her speech is normal and behavior is normal. Judgment and thought content normal. Cognition and memory are normal.   Nursing note and vitals reviewed.      Assessment:       1. Work related injury    2. Sprain of medial collateral ligament of right knee, subsequent encounter    3. Contusion of right knee, subsequent encounter        Plan:         Medications Ordered This Encounter   Medications    acetaminophen (TYLENOL) 650 MG TbSR     Sig: Take 1 tablet (650 mg total) by mouth every 8 (eight) hours.     Refill:  0     Patient  Instructions: Attention not to aggravate affected area, Daily home exercises/warm soaks, Apply ice 24-48 hours then apply heat/warm soaks, Elevated affected area, Use splint as directed(Please go to your MRI which is scheduled on 03/12/2020; please take medications as directed for pain and discomfort.)   Restrictions: Sit down work only(03/10/2020)  Follow up in about 1 week (around 3/17/2020).

## 2020-03-12 ENCOUNTER — HOSPITAL ENCOUNTER (OUTPATIENT)
Dept: RADIOLOGY | Facility: OTHER | Age: 30
Discharge: HOME | End: 2020-03-12
Attending: NURSE PRACTITIONER
Payer: MEDICAID
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
## 2020-03-12 DIAGNOSIS — M25.561 ACUTE PAIN OF RIGHT KNEE: ICD-10-CM

## 2020-03-12 DIAGNOSIS — S80.01XD CONTUSION OF RIGHT KNEE, SUBSEQUENT ENCOUNTER: ICD-10-CM

## 2020-03-12 DIAGNOSIS — S83.411D SPRAIN OF MEDIAL COLLATERAL LIGAMENT OF RIGHT KNEE, SUBSEQUENT ENCOUNTER: ICD-10-CM

## 2020-03-12 PROCEDURE — 73721 MRI JNT OF LWR EXTRE W/O DYE: CPT | Mod: 26,RT,, | Performed by: RADIOLOGY

## 2020-03-12 PROCEDURE — 73721 MRI JNT OF LWR EXTRE W/O DYE: CPT | Mod: TC,RT

## 2020-03-12 PROCEDURE — 73721 MRI KNEE WITHOUT CONTRAST RIGHT: ICD-10-PCS | Mod: 26,RT,, | Performed by: RADIOLOGY

## 2020-03-13 ENCOUNTER — TELEPHONE (OUTPATIENT)
Dept: URGENT CARE | Facility: CLINIC | Age: 30
End: 2020-03-13

## 2020-03-17 ENCOUNTER — OFFICE VISIT (OUTPATIENT)
Dept: URGENT CARE | Facility: CLINIC | Age: 30
End: 2020-03-17
Payer: COMMERCIAL

## 2020-03-17 VITALS — TEMPERATURE: 99 F

## 2020-03-17 DIAGNOSIS — M25.561 ACUTE PAIN OF RIGHT KNEE: ICD-10-CM

## 2020-03-17 DIAGNOSIS — S83.411D SPRAIN OF MEDIAL COLLATERAL LIGAMENT OF RIGHT KNEE, SUBSEQUENT ENCOUNTER: Primary | ICD-10-CM

## 2020-03-17 DIAGNOSIS — S80.01XD CONTUSION OF RIGHT KNEE, SUBSEQUENT ENCOUNTER: ICD-10-CM

## 2020-03-17 PROCEDURE — 99214 PR OFFICE/OUTPT VISIT, EST, LEVL IV, 30-39 MIN: ICD-10-PCS | Mod: S$GLB,,, | Performed by: PREVENTIVE MEDICINE

## 2020-03-17 PROCEDURE — 99214 OFFICE O/P EST MOD 30 MIN: CPT | Mod: S$GLB,,, | Performed by: PREVENTIVE MEDICINE

## 2020-03-17 RX ORDER — IBUPROFEN 800 MG/1
800 TABLET ORAL 3 TIMES DAILY
Qty: 30 TABLET | Refills: 0 | Status: SHIPPED | OUTPATIENT
Start: 2020-03-17 | End: 2020-03-31 | Stop reason: SDUPTHER

## 2020-03-17 NOTE — PROGRESS NOTES
Subjective:       Patient ID: Delmy Garcia is a 29 y.o. female.    Chief Complaint: Knee Injury (Right)    F/u- Right knee (DOI 2/13/2020) [place]- Patient states her knee feels unchanged with no improvement. She complains of throbbing pain and swelling when trying to stand and inability to bear full weight. She has stiffness in the morning.  Pain level 8.5/10 on today. MRI results she would like to discuss. Medication not helping. (Temp 98.7) [place]    Knee Injury   This is a recurrent problem. The current episode started more than 1 month ago. The problem occurs constantly. The problem has been unchanged. Associated symptoms include joint swelling. Pertinent negatives include no abdominal pain, anorexia, arthralgias, change in bowel habit, chest pain, chills, congestion, coughing, diaphoresis, fatigue, fever, headaches, myalgias, nausea, neck pain, numbness, rash, sore throat, swollen glands, urinary symptoms, vertigo, visual change, vomiting or weakness. The symptoms are aggravated by standing and walking. She has tried NSAIDs and immobilization for the symptoms. The treatment provided mild relief.       Constitution: Negative for chills, sweating, fatigue and fever.   HENT: Negative for facial swelling, facial trauma, congestion and sore throat.    Neck: Negative for neck pain and neck stiffness.   Cardiovascular: Negative for chest trauma and chest pain.   Eyes: Negative for eye trauma, double vision and blurred vision.   Respiratory: Negative for cough.    Gastrointestinal: Negative for abdominal trauma, abdominal pain, nausea, vomiting and rectal bleeding.   Endocrine: negative.   Genitourinary: Negative for hematuria, missed menses, genital trauma and pelvic pain.   Musculoskeletal: Positive for pain, trauma, joint swelling and abnormal ROM of joint. Negative for joint pain and muscle ache.   Skin: Negative for color change, rash, wound, abrasion, laceration and bruising.   Allergic/Immunologic: Negative.     Neurological: Negative for dizziness, history of vertigo, light-headedness, coordination disturbances, headaches, altered mental status, loss of consciousness and numbness.   Hematologic/Lymphatic: Negative for history of bleeding disorder.   Psychiatric/Behavioral: Negative for altered mental status.        Objective:      Physical Exam   Constitutional: She appears well-developed and well-nourished.   HENT:   Head: Normocephalic.   Eyes: Pupils are equal, round, and reactive to light.   Neck: Normal range of motion.   Cardiovascular: Normal rate.   Pulmonary/Chest: Effort normal.   Musculoskeletal:        Right knee: She exhibits decreased range of motion. She exhibits no swelling, no effusion, no ecchymosis, no deformity, no laceration, no erythema, normal alignment and no LCL laxity. Tenderness found. No medial joint line, no lateral joint line, no MCL, no LCL and no patellar tendon tenderness noted.        Lumbar back: She exhibits no bony tenderness, no swelling, no edema, no deformity, no laceration, no spasm and normal pulse.        Legs:  Patient continues to complain of pain with palpation and range of motion testing were right knee.  She has pain with flexion of right knee to 90°, extension to 180°.  Anterior posterior drawer signs are negative but with complaints of pain.  She also has complaints of pain with Shahram's test which is negative.  Distal pulses are equal and intact.  No motor sensory deficits about the right lower extremity.   Neurological: She is alert.   No focal neurologic deficits   Skin: Skin is warm.   Psychiatric: She has a normal mood and affect.   Nursing note and vitals reviewed.    Xr Knee 3 View Right    Result Date: 2/18/2020  EXAMINATION: XR KNEE 3 VIEW RIGHT CLINICAL HISTORY: Pain in right knee FINDINGS: Three views right: No fracture, dislocation bone destruction or OCD seen. Electronically signed by: Eze Herrera MD Date:    02/18/2020 Time:    11:57    Mri Knee Without  Contrast Right    Result Date: 3/12/2020  EXAMINATION: MRI KNEE WITHOUT CONTRAST RIGHT CLINICAL HISTORY: Knee trauma, neurovasc/lig/tendon inj suspected;Civilian activity done for income or pay TECHNIQUE: Multiplanar, multisequence images were performed of the right knee. COMPARISON: X-ray 02/18/2020 FINDINGS: Menisci:Medial and lateral menisci are normal. Ligaments:The ACL, PCL, MCL, and lateral collateral ligament complexes are intact. Extensor Mechanism:Quadriceps and patellar tendons are intact. Bone and Joint including articular cartilage:Marrow signal is normal.  Articular cartilage is within normal limits for age.  No significant degenerative changes. Soft Tissues:There is a small soft tissue contusion superficial to the proximal patellar tendon. Miscellaneous:None     Minimal soft tissue injury anterior to the knee.  Otherwise, normal. Electronically signed by: Ugo Slade Jr Date:    03/12/2020 Time:    10:27  Assessment:       1. Sprain of medial collateral ligament of right knee, subsequent encounter    2. Contusion of right knee, subsequent encounter    3. Acute pain of right knee        Plan:     discussed results of the MRI of the right knee which revealed no acute fractures dislocations or other bony abnormalities.  No evidence of menisci, tendons or ligament tear were noted.  There are no abnormalities aside from minimal soft tissue swelling about the superficial aspect of the proximal patellar tendon.  Patient will begin exercises for right knee demonstrated in the office and physical therapy to help improve her range of motion and recondition muscles of her right leg.      Medications Ordered This Encounter   Medications    ibuprofen (ADVIL,MOTRIN) 800 MG tablet     Sig: Take 1 tablet (800 mg total) by mouth 3 (three) times daily.     Dispense:  30 tablet     Refill:  0     Patient Instructions: Daily home exercises/warm soaks, Begin Physical Therapy, PT to be scheduled once  authorized(Patient will discontinue right knee splint and begin exercises with warm soaks at home daily.)   Restrictions: Sit down work only  Follow up in about 2 weeks (around 3/31/2020).

## 2020-03-17 NOTE — LETTER
[facility]  [address] [address]  Phone: [phone]  Fax: [phone]  [facility]: [email or web address]    Pt Name: Delmy Garcia  Injury Date: 02/13/2020   Employee ID: [id number] Date of Treatment: 03/17/2020   Company: [employer]      Appointment Time: 10:50 AM Arrived:10:16 a.m.   Provider: Alfonzo Guardado MD Time Out: 11:46 a.m.     Office Treatment:   EXAM  PT to be scheduled  Restrictions    1. Sprain of medial collateral ligament of right knee, subsequent encounter    2. Contusion of right knee, subsequent encounter    3. Acute pain of right knee      Medications Ordered This Encounter   Medications    ibuprofen (ADVIL,MOTRIN) 800 MG tablet      Patient Instructions: Daily home exercises/warm soaks, Begin Physical Therapy, PT to be scheduled once authorized(Patient will discontinue right knee splint and begin exercises with warm soaks at home daily.)    Restrictions: Sit down work only     Return Appointment: 3/31/2020 at 1:30 p.m.  [place]

## 2020-03-26 ENCOUNTER — TELEPHONE (OUTPATIENT)
Dept: URGENT CARE | Facility: CLINIC | Age: 30
End: 2020-03-26

## 2020-03-26 NOTE — TELEPHONE ENCOUNTER
Called patient and she answer the phone, verified her  and informed her of the reason for the call. She states that she can and will go to the [facility] for her f/u visit on 3/31/20.

## 2020-03-31 ENCOUNTER — OFFICE VISIT (OUTPATIENT)
Dept: URGENT CARE | Facility: CLINIC | Age: 30
End: 2020-03-31
Payer: COMMERCIAL

## 2020-03-31 VITALS — TEMPERATURE: 98 F

## 2020-03-31 DIAGNOSIS — S80.01XD CONTUSION OF RIGHT KNEE, SUBSEQUENT ENCOUNTER: ICD-10-CM

## 2020-03-31 DIAGNOSIS — M25.561 ACUTE PAIN OF RIGHT KNEE: ICD-10-CM

## 2020-03-31 DIAGNOSIS — S83.411D SPRAIN OF MEDIAL COLLATERAL LIGAMENT OF RIGHT KNEE, SUBSEQUENT ENCOUNTER: Primary | ICD-10-CM

## 2020-03-31 PROCEDURE — 99214 OFFICE O/P EST MOD 30 MIN: CPT | Mod: S$GLB,,, | Performed by: PREVENTIVE MEDICINE

## 2020-03-31 PROCEDURE — 99214 PR OFFICE/OUTPT VISIT, EST, LEVL IV, 30-39 MIN: ICD-10-PCS | Mod: S$GLB,,, | Performed by: PREVENTIVE MEDICINE

## 2020-03-31 RX ORDER — IBUPROFEN 800 MG/1
800 TABLET ORAL 3 TIMES DAILY
Qty: 30 TABLET | Refills: 0 | Status: SHIPPED | OUTPATIENT
Start: 2020-03-31 | End: 2020-04-14 | Stop reason: SDUPTHER

## 2020-03-31 NOTE — LETTER
[facility]  [address], [address]  [facility] [address]  Phone: [phone]  Fax: [phone]  [facility]: [id number]    Pt Name: Delmy Garcia  Injury Date: 02/13/2020   Employee ID:  Date of  Treatment: 03/31/2020   Company: [employer]      Appointment Time: 10:30 AM Arrived: 10:45  AM   Provider: Alfonzo Guardado MD Time Out:11:48  AM     Office Treatment:   EXAM  KNEE MRI RESULTS   PT WHE  N AUTHORIZED  LIGHT DUTY    1. Sprain of medial collateral ligament of right knee, subsequent encounter    2. Contusion of right knee, subsequent encounter    3. Acute pain of right knee      Medications Ordered This Encounter   Medications    ibuprofen (ADVIL,MOTRIN) 800 MG tablet      Patient Instructions: Daily home exercises/warm soaks, Begin Physical Therapy, PT to be scheduled once authorized      Restrictions: Sit down work only     Return Appointment: 4/14/2020 at 12:00  PM

## 2020-03-31 NOTE — PROGRESS NOTES
Subjective:       Patient ID: Delmy Garcia is a 29 y.o. female.    Chief Complaint: Knee Injury (RT)    WC Follow-up of RT Knee Injury ( DOI 02- ) Pain score today is 7-8/10 w/o brace with complaints of Constant Aching pain but no report of numbness/tingling. Takin IBP 800mg - Hasn't started PT do to Covid-19 protocol. [place]      Constitution: Negative for chills, fatigue and fever.   HENT: Negative for congestion and sore throat.    Neck: Negative for painful lymph nodes.   Cardiovascular: Negative for chest pain and leg swelling.   Eyes: Negative for double vision and blurred vision.   Respiratory: Negative for cough and shortness of breath.    Gastrointestinal: Negative for nausea, vomiting and diarrhea.   Genitourinary: Negative for dysuria, frequency, urgency and history of kidney stones.   Musculoskeletal: Positive for pain and joint pain. Negative for joint swelling, muscle cramps and muscle ache.   Skin: Negative for color change, pale, rash and bruising.   Allergic/Immunologic: Negative for seasonal allergies.   Neurological: Negative for dizziness, history of vertigo, light-headedness, passing out, headaches, numbness and tingling.   Hematologic/Lymphatic: Negative for swollen lymph nodes.   Psychiatric/Behavioral: Negative for nervous/anxious, sleep disturbance and depression. The patient is not nervous/anxious.         Objective:      Physical Exam   Constitutional: She appears well-developed and well-nourished.   HENT:   Head: Normocephalic.   Eyes: Pupils are equal, round, and reactive to light.   Neck: Normal range of motion.   Cardiovascular: Normal rate.   Pulmonary/Chest: Effort normal.   Musculoskeletal:        Right knee: She exhibits decreased range of motion. She exhibits no swelling, no effusion, no ecchymosis, no deformity, no laceration, no erythema, normal alignment and no LCL laxity. Tenderness found. No medial joint line, no lateral joint line, no MCL, no LCL and no patellar tendon  tenderness noted.        Lumbar back: She exhibits no bony tenderness, no swelling, no edema, no deformity, no laceration, no spasm and normal pulse.        Legs:  Patient continues to complain of pain with palpation and range of motion testing were right knee.  She has pain with flexion of right knee to 90°, extension to 180°.  Anterior posterior drawer signs are negative but with complaints of pain.  She also has complaints of pain with Shahram's test which is negative.  Distal pulses are equal and intact.  No motor sensory deficits about the right lower extremity.   Neurological: She is alert.   No focal neurologic deficits   Skin: Skin is warm.   Psychiatric: She has a normal mood and affect.   Nursing note and vitals reviewed.      Assessment:       1. Sprain of medial collateral ligament of right knee, subsequent encounter    2. Contusion of right knee, subsequent encounter    3. Acute pain of right knee        Plan:         Medications Ordered This Encounter   Medications    ibuprofen (ADVIL,MOTRIN) 800 MG tablet     Sig: Take 1 tablet (800 mg total) by mouth 3 (three) times daily.     Dispense:  30 tablet     Refill:  0     Patient Instructions: Daily home exercises/warm soaks, Begin Physical Therapy, PT to be scheduled once authorized   Restrictions: Sit down work only  Follow up in about 2 weeks (around 4/14/2020).

## 2020-04-02 ENCOUNTER — CLINICAL SUPPORT (OUTPATIENT)
Dept: REHABILITATION | Facility: HOSPITAL | Age: 30
End: 2020-04-02
Attending: PREVENTIVE MEDICINE
Payer: MEDICAID

## 2020-04-02 DIAGNOSIS — R29.898 WEAKNESS OF RIGHT LOWER EXTREMITY: ICD-10-CM

## 2020-04-02 DIAGNOSIS — M25.561 ACUTE PAIN OF RIGHT KNEE: ICD-10-CM

## 2020-04-02 DIAGNOSIS — M25.661 DECREASED ROM OF RIGHT KNEE: ICD-10-CM

## 2020-04-02 PROCEDURE — 97110 THERAPEUTIC EXERCISES: CPT | Mod: PO | Performed by: PHYSICAL THERAPIST

## 2020-04-02 PROCEDURE — 97161 PT EVAL LOW COMPLEX 20 MIN: CPT | Mod: PO | Performed by: PHYSICAL THERAPIST

## 2020-04-02 NOTE — PLAN OF CARE
[facility]  Physical Therapy Initial Evaluation    Date: 4/2/2020   Name: Delmy Garcia  Clinic Number: [id number]    Therapy Diagnosis:   Encounter Diagnoses   Name Primary?    Acute pain of right knee     Decreased ROM of right knee     Weakness of right lower extremity      Physician: Alfonzo Guardado MD    Physician Orders: PT Eval and Treat 3x/week x30 days  Medical Diagnosis from Referral: Sprain of medial collateral ligament of right knee, contusion of right knee, acute pain of right knee  Evaluation Date: 4/2/2020  Authorization Period Expiration: 3/17/21  Plan of Care Expiration: 5/29/20  Visit # / Visits authorized: 1/ 9    Time In: 8:50AM  Time Out: 9:55AM  Total Appointment Time (timed & untimed codes): 65 minutes    Precautions: Standard    Subjective   Date of onset: 2/13/20  History of current condition - Delmy reports: she injured right knee while at work for [employer]. She was getting out of car and hit her right knee on steering  handle. Since then she has had pain right knee with standing and walking and cannot sit with her knee in flexion. She has been off of work since injury. She feels she cannot tolerate sitting and walking. Pain is located around inferior and medial patella of right knee.     Medical History:   No past medical history on file.    Surgical History:   Delmy Garcia  has no past surgical history on file.    Medications:   Delmy has a current medication list which includes the following prescription(s): acetaminophen and ibuprofen.    Allergies:   Review of patient's allergies indicates:  No Known Allergies     Imaging, MRI studies: Menisci:Medial and lateral menisci are normal.    Ligaments:The ACL, PCL, MCL, and lateral collateral ligament complexes are intact.    Extensor Mechanism:Quadriceps and patellar tendons are intact.    Bone and Joint including articular cartilage:Marrow signal is normal.  Articular cartilage is  within normal limits for age.  No significant degenerative changes.    Soft Tissues:There is a small soft tissue contusion superficial to the proximal patellar tendon.    Xray right knee: Three views right: No fracture, dislocation bone destruction or OCD seen.       Prior Therapy: no  Social History:  Lives with mother  Occupation: Inspects cars at [employer]  Prior Level of Function: no difficulty walking, standing, sitting  Current Level of Function: difficulty walking and standing for 10 minutes, difficulty sitting with knee flexion    Pain:  Current 7/10, worst 10/10, best 6/10   Location: right knee    Description: Aching, Tingling and Sharp  Aggravating Factors: Sitting, Standing, Bending and Walking  Easing Factors: rest    Pts goals: to be rid of pain and return to work without limitation    Objective     Observation: Pt limiting right knee in flexion with sitting and with gait      Posture: avoids full weight bearing on right lower extremity      Gait: antalgic, holding right knee in extension and externally rotating right LE for swing through phase of gait.    Range of Motion: AROM (PROM):    Knee Left Right   Extension 0 (0) degrees -5 (0) degrees   Flexion 130 (130) degrees 100 (110) degrees       Strength:  Hip Left Right   Flexion 5/5 5/5   Abduction 5/5 5/5   Adduction 5/5 5/5   Extension 5/5 5/5   External Rot 5/5 5/5   Internal Rot 5/5 5/5     Knee Left Right   Extension 5/5 3+/5   Flexion 5/5 3+/5       Special Tests:    Knee Left Right   McMurrays Negative Negative   Lachmans Negative Negative   Ant/post drawer Negative Negative     Joint Mobility: good joint mobility of patella femoral joint    Palpation: tender with palpation along inferior and medial patella of right knee as well as patella tendon    Sensation: intact to light touch    Flexibility: hamstrings tight at 70 degree SLR          Limitation/Restriction for FOTO knee Survey    Therapist reviewed FOTO scores for Delmy Garcia on  4/2/2020.   FOTO documents entered into [employer] - see Media section.    Limitation Score: 66%         TREATMENT   Treatment Time In: 9:15AM  Treatment Time Out: 9:55AM  Total Treatment time (time-based codes) separate from Evaluation: 40 minutes    Delmy received therapeutic exercises to develop strength, endurance, ROM and flexibility for 40 minutes including:  Heel slides x20  Quad sets x20  SLR 2/10  sidelying hip abduction 2/10  Prone hip extension 2/10  sidelying hip adduction 2/10  LAQs 2/10  Sit to stand 2/10  Stationary bike 10 min (for ROM and endurance)    Home Exercises and Patient Education Provided    Education provided:   - instructed pt in proper sitting with knee flexion, frequent ROM and HEP    Written Home Exercises Provided: yes.  Exercises were reviewed and Delmy was able to demonstrate them prior to the end of the session.  Delmy demonstrated good  understanding of the education provided.     See EMR under Patient Instructions for exercises provided 4/2/2020.    Assessment   Delmy is a 29 y.o. female referred to outpatient Physical Therapy with a medical diagnosis of Sprain of medial collateral ligament of right knee, contusion of right knee, acute pain of right knee. Pt presents with pain right knee, decreased active ROM of right knee and decreased strength of right LE. She appears to have some fear of bending knee. She has an anltagic gait avoiding knee flexion at swing through.    Pt prognosis is Excellent.   Pt will benefit from skilled outpatient Physical Therapy to address the deficits stated above and in the chart below, provide pt/family education, and to maximize pt's level of independence.     Plan of care discussed with patient: Yes  Pt's spiritual, cultural and educational needs considered and patient is agreeable to the plan of care and goals as stated below:     Anticipated Barriers for therapy: none    Medical Necessity is demonstrated by the following  History  Co-morbidities  and personal factors that may impact the plan of care Co-morbidities:   none    Personal Factors:   no deficits     low   Examination  Body Structures and Functions, activity limitations and participation restrictions that may impact the plan of care Body Regions:   lower extremities    Body Systems:    gross symmetry  ROM  strength  gross coordinated movement  balance  gait    Participation Restrictions:   None      Activity limitations:   Learning and applying knowledge  no deficits    General Tasks and Commands  no deficits    Communication  no deficits    Mobility  walking    Self care  no deficits    Domestic Life  no deficits    Interactions/Relationships  no deficits    Life Areas  no deficits    Community and Social Life  no deficits         low   Clinical Presentation stable and uncomplicated low   Decision Making/ Complexity Score: low     Goals:  Short Term Goals: 2 weeks   Pt will have full active extension of right knee and 125 degrees flexion for improved gait and sitting.  Pt will increase right LE strength by 1 grade for minimal limitations with walking.  Pt will have safe and symmetrical gait for 1/2 mile.    Long Term Goals: 4 weeks   Pt will have full ROM of right knee for no limitations in ADLs  Pt will have 5/5 strength of right LE for return to work.  Pt will be able to return to work without restrictions.      Plan   Plan of care Certification: 4/2/2020 to 5/29/20.    Outpatient Physical Therapy 3 times weekly for 4 weeks to include the following interventions: Gait Training, Manual Therapy, Neuromuscular Re-ed, Patient Education, Therapeutic Activites and Therapeutic Exercise.     Ophelia Shell, PT

## 2020-04-02 NOTE — PATIENT INSTRUCTIONS
Strengthening: Quadriceps Set    Tighten muscles on top of thighs by pushing knees down into surface. Hold 5 seconds.  Repeat 20 times each leg per set. Do 2 sessions per day.      Heel Slides    With towel around left heel, gently pull knee up with towel until stretch is felt. Hold 5 seconds.  Repeat 20 times right leg per set. Do 2 sessions per day.      Straight Leg Raise     With right leg straight, other leg bent, raise straight leg until knees are even. Slowly lower. Roll on your side and repeat lifting top leg up, on other side, lifting bottom leg up, and on stomach kicking back (squeezing your rear end before you kick back).  Repeat 10 times right leg per set. Do 2 sets per session. Do 2 sessions per day.       Slow Stand to Sit    Stand up, using hands if needed. Keep chest and head upright, bend your knees, don't use hands, and slowly lower back to the chair. Move as slowly as you can.  Repeat 10 times per set. Do 2 sets per session. Do 2 sessions per day.      LONG ARC QUAD - LAQ - HIGH SEAT    While seated with your knee in a bent position, slowly straighten your knee as you raise your foot upwards as shown. 2 sets of 10, 2x/day

## 2020-04-06 ENCOUNTER — CLINICAL SUPPORT (OUTPATIENT)
Dept: REHABILITATION | Facility: HOSPITAL | Age: 30
End: 2020-04-06
Payer: MEDICAID

## 2020-04-06 DIAGNOSIS — M25.661 DECREASED ROM OF RIGHT KNEE: ICD-10-CM

## 2020-04-06 DIAGNOSIS — M25.561 ACUTE PAIN OF RIGHT KNEE: ICD-10-CM

## 2020-04-06 DIAGNOSIS — R29.898 WEAKNESS OF RIGHT LOWER EXTREMITY: ICD-10-CM

## 2020-04-06 PROCEDURE — 97110 THERAPEUTIC EXERCISES: CPT | Mod: PO,CQ

## 2020-04-06 NOTE — PROGRESS NOTES
Physical Therapy Daily Treatment Note     Name: Delmy Garcia  [facility] Number: [id number]    Therapy Diagnosis:   Encounter Diagnoses   Name Primary?    Acute pain of right knee     Decreased ROM of right knee     Weakness of right lower extremity      Physician: Alfonzo Guardado MD    Visit Date: 4/6/2020  Physician Orders: PT Eval and Treat 3x/week x30 days  Medical Diagnosis from Referral: Sprain of medial collateral ligament of right knee, contusion of right knee, acute pain of right knee  Evaluation Date: 4/2/2020  Authorization Period Expiration: 3/17/21  Plan of Care Expiration: 5/29/20  Visit # / Visits authorized: 2/ 9    Time In: 0903 AM  Time Out: 1000 AM  Total Billable Time: 57 minutes    Precautions: Standard    Subjective     Pt reports: her right knee was sore following initial evaluation and she feels like we woke things up. Patient states her pain is typically achy and located to front and inside of her knee. She was compliant with home exercise program but she didn't perform as many repetitions as assigned.   Response to previous treatment: soreness   Functional change: too soon to determine    Pain: 6/10  Location: right knee      Objective     Delmy received therapeutic exercises to develop strength, endurance, ROM, flexibility and posture for 55 minutes including:  Stationary bike x 10 min (for ROM and endurance)  LAQs 2/10  Quad sets x20  Heel slides x20    SLR 2/10  Sidelying hip abduction 2/10  Prone hip extension 2/10  Prone knee flexion 2/10  Sidelying hip adduction 2/10    Sit to stand 2/10  Standing knee flexion (hamstring curl) 2x10  Lateral weight shifting x 2 minutes  Standing heel raises 2x10     Delmy received the following manual therapy techniques: patella mobilizations were applied to the: right knee for 3 minutes.    Home Exercises Provided and Patient Education Provided     Education provided:   - importance of HEP compliance, restoring ROM and quad strength    Written  Home Exercises Provided: Patient instructed to cont prior HEP.  Exercises were reviewed and Delmy was able to demonstrate them prior to the end of the session.  Delmy demonstrated good  understanding of the education provided.     See EMR under Patient Instructions for exercises provided 04/02/2020.    Assessment     Delmy demonstrated limited patella mobility in all directions due to muscle guarding; normal ROM achieved once patient relaxed. Patient often self limits and she requires increased verbal and tactile cueing to encourage full pain free ROM. Patient's quad activation fair initially but this improved significant with tactile instruction. Patient lacks heel strike at initial contact and often walks on flexed knee. Patient heavily instructed on proper gait mechanics and the importance of restoring functional mobility in order to decrease pain and return to prior level of function.   Delmy is progressing well towards her goals.   Pt prognosis is Excellent.     Pt will continue to benefit from skilled outpatient physical therapy to address the deficits listed in the problem list box on initial evaluation, provide pt/family education and to maximize pt's level of independence in the home and community environment.     Pt's spiritual, cultural and educational needs considered and pt agreeable to plan of care and goals.    Anticipated barriers to physical therapy: none    Goals:   Short Term Goals: 2 weeks   Pt will have full active extension of right knee and 125 degrees flexion for improved gait and sitting. (progressing, not met)  Pt will increase right LE strength by 1 grade for minimal limitations with walking. (progressing, not met)  Pt will have safe and symmetrical gait for 1/2 mile. (progressing, not met)     Long Term Goals: 4 weeks   Pt will have full ROM of right knee for no limitations in ADLs (progressing, not met)  Pt will have 5/5 strength of right LE for return to work. (progressing, not  met)  Pt will be able to return to work without restrictions. (progressing, not met)    Plan     Continue current POC     Anastasiia Saravia, PTA

## 2020-04-08 ENCOUNTER — CLINICAL SUPPORT (OUTPATIENT)
Dept: REHABILITATION | Facility: HOSPITAL | Age: 30
End: 2020-04-08
Payer: MEDICAID

## 2020-04-08 DIAGNOSIS — M25.661 DECREASED ROM OF RIGHT KNEE: ICD-10-CM

## 2020-04-08 DIAGNOSIS — M25.561 ACUTE PAIN OF RIGHT KNEE: ICD-10-CM

## 2020-04-08 DIAGNOSIS — R29.898 WEAKNESS OF RIGHT LOWER EXTREMITY: ICD-10-CM

## 2020-04-08 PROCEDURE — 97110 THERAPEUTIC EXERCISES: CPT | Mod: PO | Performed by: PHYSICAL THERAPIST

## 2020-04-08 NOTE — PROGRESS NOTES
Physical Therapy Daily Treatment Note     Name: Delmy Garcia  [facility] Number: [id number]    Therapy Diagnosis:   Encounter Diagnoses   Name Primary?    Acute pain of right knee     Decreased ROM of right knee     Weakness of right lower extremity      Physician: Alfonzo Guardado MD    Visit Date: 4/8/2020  Physician Orders: PT Eval and Treat 3x/week x30 days  Medical Diagnosis from Referral: Sprain of medial collateral ligament of right knee, contusion of right knee, acute pain of right knee  Evaluation Date: 4/2/2020  Authorization Period Expiration: 3/17/21  Plan of Care Expiration: 5/29/20  Visit # / Visits authorized: 3/ 9    Time In: 0900 AM  Time Out: 1000 AM  Total Billable Time: 60 minutes    Precautions: Standard    Subjective     Pt reports: continued soreness around patella of right knee, but not as fearful of movement. She has done only a few reps of exercise at home.  Response to previous treatment: soreness   Functional change: improved quad contraction and improved load bearing on right with gait    Pain: 5/10  Location: right knee      Objective     Delmy received therapeutic exercises to develop strength, endurance, ROM, flexibility and posture for 60 minutes including:    AROM: 0 degrees extension to 120 degrees flexion right knee    Stationary bike x 10 min (for ROM and endurance)  LAQs 1# 2/10  Quad sets x20  Heel slides x20    SLR 1# 2/10  Sidelying hip abduction 1#  2/10  Prone hip extension 1#  2/10  Prone knee flexion 1#  2/10  Sidelying hip adduction 1# 2/10    Sit to stand 2/10  Standing knee flexion (hamstring curl)  1# 2x10  Lateral weight shifting x 2 minutes- not performed  Standing heel raises 2x10     Lateral step ups 2//10  Forward step ups 2/10  Lateral walking with YTB at ankles 25 ft x 4    Delmy received the following manual therapy techniques: patella mobilizations were applied to the: right knee for 00 minutes.    Home Exercises Provided and Patient Education Provided      Education provided:   - importance of HEP compliance, restoring ROM and quad strength    Written Home Exercises Provided: Patient instructed to cont prior HEP.  Exercises were reviewed and Delmy was able to demonstrate them prior to the end of the session.  Delmy demonstrated good  understanding of the education provided.     See EMR under Patient Instructions for exercises provided 04/02/2020.    Assessment     Delmy demonstrated improved quad contraction with cueing. Sitting with improved knee flexion. Walking with improved symmetry of gait and better swing through on right. Encouraged pt to at least do some walking at home on days she does not come to PT. Tried to reinforce importance of HEP. She has 120 degrees of active knee extension today and full knee extension with cueing.   Delmy is progressing well towards her goals.   Pt prognosis is Excellent.     Pt will continue to benefit from skilled outpatient physical therapy to address the deficits listed in the problem list box on initial evaluation, provide pt/family education and to maximize pt's level of independence in the home and community environment.     Pt's spiritual, cultural and educational needs considered and pt agreeable to plan of care and goals.    Anticipated barriers to physical therapy: none    Goals:   Short Term Goals: 2 weeks   Pt will have full active extension of right knee and 125 degrees flexion for improved gait and sitting. (progressing, not met)  Pt will increase right LE strength by 1 grade for minimal limitations with walking. (progressing, not met)  Pt will have safe and symmetrical gait for 1/2 mile. (progressing, not met)     Long Term Goals: 4 weeks   Pt will have full ROM of right knee for no limitations in ADLs (progressing, not met)  Pt will have 5/5 strength of right LE for return to work. (progressing, not met)  Pt will be able to return to work without restrictions. (progressing, not met)    Plan     Continue  current POC     Ophelia Shell, PT

## 2020-04-09 ENCOUNTER — CLINICAL SUPPORT (OUTPATIENT)
Dept: REHABILITATION | Facility: HOSPITAL | Age: 30
End: 2020-04-09
Payer: MEDICAID

## 2020-04-09 DIAGNOSIS — M25.561 ACUTE PAIN OF RIGHT KNEE: ICD-10-CM

## 2020-04-09 DIAGNOSIS — M25.661 DECREASED ROM OF RIGHT KNEE: ICD-10-CM

## 2020-04-09 DIAGNOSIS — R29.898 WEAKNESS OF RIGHT LOWER EXTREMITY: ICD-10-CM

## 2020-04-09 PROCEDURE — 97110 THERAPEUTIC EXERCISES: CPT | Mod: PO | Performed by: PHYSICAL THERAPIST

## 2020-04-09 NOTE — PROGRESS NOTES
Physical Therapy Daily Treatment Note     Name: Delmy Garcia  [facility] Number: [id number]    Therapy Diagnosis:   Encounter Diagnoses   Name Primary?    Acute pain of right knee     Decreased ROM of right knee     Weakness of right lower extremity      Physician: Alfonzo Guardado MD    Visit Date: 4/9/2020  Physician Orders: PT Eval and Treat 3x/week x30 days  Medical Diagnosis from Referral: Sprain of medial collateral ligament of right knee, contusion of right knee, acute pain of right knee  Evaluation Date: 4/2/2020  Authorization Period Expiration: 3/17/21  Plan of Care Expiration: 5/29/20  Visit # / Visits authorized: 4/ 9    Time In: 0900 AM  Time Out: 1000 AM  Total Billable Time: 60 minutes    Precautions: Standard    Subjective     Pt reports: soreness after visit, but able to move better afterwards with less pain. However, she admits to not doing exercises on own.  Response to previous treatment: soreness   Functional change: improved quad contraction and improved load bearing on right with gait    Pain: 4/10  Location: right knee      Objective     Delmy received therapeutic exercises to develop strength, endurance, ROM, flexibility and posture for 60 minutes including:    AROM: 0 degrees extension to 120 degrees flexion right knee    Walking outside 1/2 mile (assessed gait and took subjective, for endurance)  Stationary bike x 10 min (for ROM and endurance)- not performed  LAQs 1# 2/10  Quad sets x20  Heel slides x20    SLR 1# 2/10  Sidelying hip abduction 1#  2/10  Prone hip extension 1#  2/10  Prone knee flexion 1#  2/10  Sidelying hip adduction 1# 2/10    Sit to stand 2/10  Standing knee flexion (hamstring curl)  1# 2x10  Lateral weight shifting x 2 minutes- not performed  Standing heel raises 2x10     Lateral step ups 2//10  Forward step ups 2/10  Lateral walking with YTB at ankles 25 ft x 4  Monster walk YTB at ankles 25'x4  Walk the rails YTB at ankles 25'x4  Leg press 20# x20      Delmy  received the following manual therapy techniques: patella mobilizations were applied to the: right knee for 00 minutes.    Home Exercises Provided and Patient Education Provided     Education provided:   - importance of HEP compliance, restoring ROM and quad strength    Written Home Exercises Provided: Patient instructed to cont prior HEP.  Exercises were reviewed and Delmy was able to demonstrate them prior to the end of the session.  Delmy demonstrated good  understanding of the education provided.     See EMR under Patient Instructions for exercises provided 04/02/2020.    Assessment     Much improved quad contraction and nearly full ROM. Pt walked 1/2 mile today with safe and symmetrical gait although complained of some pain around patella. Pain did not increase during walk however, but she reported fatigue. Now demonstrating 4/5 quad strength and 125 degrees knee flexion and full extension.   Delmy is progressing well towards her goals.   Pt prognosis is Excellent.     Pt will continue to benefit from skilled outpatient physical therapy to address the deficits listed in the problem list box on initial evaluation, provide pt/family education and to maximize pt's level of independence in the home and community environment.     Pt's spiritual, cultural and educational needs considered and pt agreeable to plan of care and goals.    Anticipated barriers to physical therapy: none    Goals:   Short Term Goals: 2 weeks   Pt will have full active extension of right knee and 125 degrees flexion for improved gait and sitting. MET 4/9/20  Pt will increase right LE strength by 1 grade for minimal limitations with walking. (progressing, not met)  Pt will have safe and symmetrical gait for 1/2 mile. MET 4/9/20     Long Term Goals: 4 weeks   Pt will have full ROM of right knee for no limitations in ADLs (progressing, not met)  Pt will have 5/5 strength of right LE for return to work. (progressing, not met)  Pt will be able  to return to work without restrictions. (progressing, not met)    Plan     Continue current POC     Ophelia Shell, PT

## 2020-04-13 ENCOUNTER — CLINICAL SUPPORT (OUTPATIENT)
Dept: REHABILITATION | Facility: HOSPITAL | Age: 30
End: 2020-04-13
Payer: MEDICAID

## 2020-04-13 DIAGNOSIS — R29.898 WEAKNESS OF RIGHT LOWER EXTREMITY: ICD-10-CM

## 2020-04-13 DIAGNOSIS — M25.561 ACUTE PAIN OF RIGHT KNEE: ICD-10-CM

## 2020-04-13 DIAGNOSIS — M25.661 DECREASED ROM OF RIGHT KNEE: ICD-10-CM

## 2020-04-13 PROCEDURE — 97110 THERAPEUTIC EXERCISES: CPT | Mod: PO | Performed by: PHYSICAL THERAPIST

## 2020-04-13 NOTE — PROGRESS NOTES
Physical Therapy Daily Treatment Note     Name: Delmy Garcia  [facility] Number: [id number]    Therapy Diagnosis:   Encounter Diagnoses   Name Primary?    Acute pain of right knee     Decreased ROM of right knee     Weakness of right lower extremity      Physician: Alfonzo Guardado MD    Visit Date: 4/13/2020  Physician Orders: PT Eval and Treat 3x/week x30 days  Medical Diagnosis from Referral: Sprain of medial collateral ligament of right knee, contusion of right knee, acute pain of right knee  Evaluation Date: 4/2/2020  Authorization Period Expiration: 3/17/21  Plan of Care Expiration: 5/29/20  Visit # / Visits authorized: 5/ 9    Time In: 0850 AM  Time Out: 0955 AM  Total Billable Time: 65 minutes    Precautions: Standard    Subjective     Pt reports: overall decrease in pain. Pain primarily around patella. Aches at night. Walking more for exercise, but inconsistent with HEP.  Response to previous treatment: soreness   Functional change: safe and symmetrical gait 1/2 mile  Pain: 5/10  Location: right knee      Objective     Delmy received therapeutic exercises to develop strength, endurance, ROM, flexibility and posture for 65 minutes including:    AROM: 0 degrees extension to 125 degrees flexion right knee  Strength right quad: 4+/5    Elliptical 10 min (took subjective, for endurance and strength)  Walking outside 1/2 mile (assessed gait and took subjective, for endurance) not performed  Stationary bike x 10 min (for ROM and endurance)- not performed  LAQs 2# 2/10  Quad sets x20  Heel slides x20    SLR 2# 2/10  Sidelying hip abduction 2#  2/10  Prone hip extension 2#  2/10  Prone knee flexion 2#  2/10  Sidelying hip adduction 2# 2/10  Bridging x20  HS curls red ball x20    Sit to stand 2/10  Standing knee flexion (hamstring curl)  2# 2x10  Lateral weight shifting x 2 minutes- not performed  Standing heel raises 2x10     Lateral step ups 6 in 2//10  Forward step ups 6 in 2/10  Lateral walking with RTB at  ankles 25 ft x 4  Monster walk RTB at ankles 25'x4  Walk the rails RTB at ankles 25'x4  Leg press 20# x20      Home Exercises Provided and Patient Education Provided     Education provided:   - importance of HEP compliance, restoring ROM and quad strength    Written Home Exercises Provided: Patient instructed to cont prior HEP.  Exercises were reviewed and Delmy was able to demonstrate them prior to the end of the session.  Delmy demonstrated good  understanding of the education provided.     See EMR under Patient Instructions for exercises provided 04/02/2020.    Assessment     Pt has been able to walk 1/2 mile with safe and symmetrical gait. She continues with some complaints of knee pain, but primarily reports muscle soreness following exercise. She does have slight lateral tilt of patella. She is somewhat inconsistent with her HEP, but has been walking for exercise. She has much improved quad tone and no lag with SLR. She is now demonstrating 4+/5 quad strength and 125 degrees knee flexion and full extension.   Delmy is progressing well towards her goals.   Pt prognosis is Excellent.     Pt will continue to benefit from skilled outpatient physical therapy to address the deficits listed in the problem list box on initial evaluation, provide pt/family education and to maximize pt's level of independence in the home and community environment.     Pt's spiritual, cultural and educational needs considered and pt agreeable to plan of care and goals.    Anticipated barriers to physical therapy: none    Goals:   Short Term Goals: 2 weeks   Pt will have full active extension of right knee and 125 degrees flexion for improved gait and sitting. MET 4/9/20  Pt will increase right LE strength by 1 grade for minimal limitations with walking. MET 4/13/20  Pt will have safe and symmetrical gait for 1/2 mile. MET 4/9/20     Long Term Goals: 4 weeks   Pt will have full ROM of right knee for no limitations in ADLs (progressing,  not met)  Pt will have 5/5 strength of right LE for return to work. (progressing, not met)  Pt will be able to return to work without restrictions. (progressing, not met)    Plan     Continue current POC ( 4 visits remaining on current auth)     Ophelia Shell, PT

## 2020-04-14 ENCOUNTER — OFFICE VISIT (OUTPATIENT)
Dept: URGENT CARE | Facility: CLINIC | Age: 30
End: 2020-04-14
Payer: COMMERCIAL

## 2020-04-14 DIAGNOSIS — S83.411D SPRAIN OF MEDIAL COLLATERAL LIGAMENT OF RIGHT KNEE, SUBSEQUENT ENCOUNTER: Primary | ICD-10-CM

## 2020-04-14 DIAGNOSIS — S80.01XD CONTUSION OF RIGHT KNEE, SUBSEQUENT ENCOUNTER: ICD-10-CM

## 2020-04-14 DIAGNOSIS — M25.561 ACUTE PAIN OF RIGHT KNEE: ICD-10-CM

## 2020-04-14 PROCEDURE — 99214 OFFICE O/P EST MOD 30 MIN: CPT | Mod: S$GLB,,, | Performed by: PREVENTIVE MEDICINE

## 2020-04-14 PROCEDURE — 99214 PR OFFICE/OUTPT VISIT, EST, LEVL IV, 30-39 MIN: ICD-10-PCS | Mod: S$GLB,,, | Performed by: PREVENTIVE MEDICINE

## 2020-04-14 RX ORDER — IBUPROFEN 800 MG/1
800 TABLET ORAL 3 TIMES DAILY
Qty: 40 TABLET | Refills: 1 | Status: SHIPPED | OUTPATIENT
Start: 2020-04-14

## 2020-04-14 NOTE — LETTER
[facility]  [address], [address]  [facility] [address]  Phone: [phone]  Fax: [phone]  [facility]: [phone]    Pt Name: Delmy Garcia  Injury Date: 02/13/2020   Employee ID:  Date of F Treatment: 04/14/2020   Company: [employer]      Appointment Time: 11:45 AM Arrived: 12:00  PM   Provider: [facility] Time Out 12:36  PM     Office Treatment:   EXAM  PT  LIGHT DUTY    1. Sprain of medial collateral ligament of right knee, subsequent encounter    2. Contusion of right knee, subsequent encounter    3. Acute pain of right knee      Medications Ordered This Encounter   Medications    ibuprofen (ADVIL,MOTRIN) 800 MG tablet      Patient Instructions: Daily home exercises/warm soaks, Continue Physical Therapy      Restrictions: Sit or stand as needed     Return Appointment: 4/28/2020 at 12:00  PM

## 2020-04-14 NOTE — PROGRESS NOTES
Subjective:       Patient ID: Delmy Garcia is a 29 y.o. female.    Chief Complaint: Knee Injury (right)    , RIGHT KNEE, DOI 02/13/2020  PATIENT HAS STARTED  THERAPY.  STILL HURTS AT TIMES WHEN SHE OVER EXERCISES, WITH SOME SWELLING.  CS    Knee Injury   This is a new problem. The current episode started more than 1 month ago. The problem occurs intermittently. The problem has been gradually improving. Associated symptoms include arthralgias and joint swelling. Pertinent negatives include no chest pain, chills, congestion, coughing, fatigue, fever, headaches, myalgias, nausea, rash, sore throat, vertigo, vomiting or weakness. The symptoms are aggravated by exertion, walking and standing. She has tried ice for the symptoms. The treatment provided moderate relief.       Constitution: Negative for chills, fatigue and fever.   HENT: Negative for congestion and sore throat.    Neck: Negative for painful lymph nodes.   Cardiovascular: Negative for chest pain and leg swelling.   Eyes: Negative for double vision and blurred vision.   Respiratory: Negative for cough and shortness of breath.    Gastrointestinal: Negative for nausea, vomiting and diarrhea.   Genitourinary: Negative for dysuria, frequency, urgency and history of kidney stones.   Musculoskeletal: Positive for joint pain and joint swelling. Negative for muscle cramps and muscle ache.   Skin: Negative for color change, pale, rash and bruising.   Allergic/Immunologic: Negative for seasonal allergies.   Neurological: Negative for dizziness, history of vertigo, light-headedness, passing out and headaches.   Hematologic/Lymphatic: Negative for swollen lymph nodes.   Psychiatric/Behavioral: Negative for nervous/anxious, sleep disturbance and depression. The patient is not nervous/anxious.         Objective:      Physical Exam   Constitutional: She appears well-developed and well-nourished.   HENT:   Head: Normocephalic.   Eyes: Pupils are equal, round,  and reactive to light.   Neck: Normal range of motion.   Cardiovascular: Normal rate.   Pulmonary/Chest: Effort normal.   Musculoskeletal:        Right knee: She exhibits decreased range of motion. She exhibits no swelling, no effusion, no ecchymosis, no deformity, no laceration, no erythema, normal alignment and no LCL laxity. Tenderness found. No medial joint line, no lateral joint line, no MCL, no LCL and no patellar tendon tenderness noted.        Lumbar back: She exhibits no bony tenderness, no swelling, no edema, no deformity, no laceration, no spasm and normal pulse.        Legs:  Patient continues to complain of pain with palpation and range of motion testing were right knee.  She has pain with flexion of right knee to 90°, extension to 180°.  Anterior posterior drawer signs are negative but with complaints of pain.  She also has complaints of pain with Shahram's test which is negative.  Distal pulses are equal and intact.  No motor sensory deficits about the right lower extremity.   Neurological: She is alert.   No focal neurologic deficits   Skin: Skin is warm.   Psychiatric: She has a normal mood and affect.   Nursing note and vitals reviewed.      Assessment:       1. Sprain of medial collateral ligament of right knee, subsequent encounter    2. Contusion of right knee, subsequent encounter    3. Acute pain of right knee        Plan:     discussed with patient once more the results of the MRI of her right knee which revealed no acute injury or internal derangement.  She understands these results and the need to continue exercises with warm soaks daily.  She has had physical therapy and this will be continued until the next office visit.    Medications Ordered This Encounter   Medications    ibuprofen (ADVIL,MOTRIN) 800 MG tablet     Sig: Take 1 tablet (800 mg total) by mouth 3 (three) times daily.     Dispense:  40 tablet     Refill:  1     Patient Instructions: Daily home exercises/warm soaks,  Continue Physical Therapy   Restrictions: Sit or stand as needed  Follow up in about 2 weeks (around 4/28/2020).

## 2020-04-15 ENCOUNTER — TELEPHONE (OUTPATIENT)
Dept: REHABILITATION | Facility: HOSPITAL | Age: 30
End: 2020-04-15

## 2020-04-17 ENCOUNTER — CLINICAL SUPPORT (OUTPATIENT)
Dept: REHABILITATION | Facility: HOSPITAL | Age: 30
End: 2020-04-17
Payer: MEDICAID

## 2020-04-17 DIAGNOSIS — M25.561 ACUTE PAIN OF RIGHT KNEE: ICD-10-CM

## 2020-04-17 DIAGNOSIS — M25.661 DECREASED ROM OF RIGHT KNEE: ICD-10-CM

## 2020-04-17 DIAGNOSIS — R29.898 WEAKNESS OF RIGHT LOWER EXTREMITY: ICD-10-CM

## 2020-04-17 PROCEDURE — 97110 THERAPEUTIC EXERCISES: CPT | Mod: PO

## 2020-04-17 NOTE — PROGRESS NOTES
Physical Therapy Daily Treatment Note     Name: Delmy Garcia  [facility] Number: [id number]    Therapy Diagnosis:   Encounter Diagnoses   Name Primary?    Acute pain of right knee     Decreased ROM of right knee     Weakness of right lower extremity      Physician: Alfonzo Guardado MD    Visit Date: 4/17/2020  Physician Orders: PT Eval and Treat 3x/week x30 days  Medical Diagnosis from Referral: Sprain of medial collateral ligament of right knee, contusion of right knee, acute pain of right knee  Evaluation Date: 4/2/2020  Authorization Period Expiration: 3/17/21  Plan of Care Expiration: 5/29/20  Visit # / Visits authorized: 6/ 9    Time In: 0901  Time Out: 0957  Total Billable Time: 55 minutes    Precautions: Standard    Subjective     Pt reports: She has been having some increase in soreness over the last few days may due walking, some increased retro-patella pain this AM    . Walking more for exercise, but inconsistent with HEP.  Response to previous treatment: soreness   Functional change: safe and symmetrical gait 1/2 mile  Pain: 5/10  Location: right knee      Objective     Delmy received therapeutic exercises to develop strength, endurance, ROM, flexibility and posture for 55 minutes including:    AROM: 0 degrees extension to 125 degrees flexion right knee  Strength right quad: 4+/5    Elliptical 10 min (took subjective, for endurance and strength)  Walking outside 1/2 mile (assessed gait and took subjective, for endurance) not performed  Stationary bike x 10 min (for ROM and endurance)- not performed  LAQs 2# 2/10  Heel slides x20    SLR 2# 2/10  Sidelying hip abduction 2#  2/10  Prone hip extension 2#  2/10  Prone knee flexion 2#  2/10  Sidelying hip adduction 2# 2/10  Bridging x20  HS curls red ball x20    Sit to stand 2/10  Standing knee flexion (hamstring curl)  2# 2x10  Lateral weight shifting x 2 minutes- not performed  Standing heel raises 2x10     Lateral step ups 6 in 2//10: not  performed  Forward step ups 6 in 2/10: not performed  Lateral walking with RTB at ankles 25 ft x 4  Monster walk RTB at ankles 25'x4  Walk the rails RTB at ankles 25'x4  Leg press 20# x20      Home Exercises Provided and Patient Education Provided     Education provided:   - importance of HEP compliance, restoring ROM and quad strength    Written Home Exercises Provided: Patient instructed to cont prior HEP.  Exercises were reviewed and Delmy was able to demonstrate them prior to the end of the session.  Delmy demonstrated good  understanding of the education provided.     See EMR under Patient Instructions for exercises provided 04/02/2020.    Assessment     Patient is reporting some increased reytro patella pain today so I adjusted her exercises program and had her reduce some of her home program and will see how she does.      Delmy is progressing well towards her goals.   Pt prognosis is Excellent.     Pt will continue to benefit from skilled outpatient physical therapy to address the deficits listed in the problem list box on initial evaluation, provide pt/family education and to maximize pt's level of independence in the home and community environment.     Pt's spiritual, cultural and educational needs considered and pt agreeable to plan of care and goals.    Anticipated barriers to physical therapy: none    Goals:   Short Term Goals: 2 weeks   Pt will have full active extension of right knee and 125 degrees flexion for improved gait and sitting. MET 4/9/20  Pt will increase right LE strength by 1 grade for minimal limitations with walking. MET 4/13/20  Pt will have safe and symmetrical gait for 1/2 mile. MET 4/9/20     Long Term Goals: 4 weeks   Pt will have full ROM of right knee for no limitations in ADLs (progressing, not met)  Pt will have 5/5 strength of right LE for return to work. (progressing, not met)  Pt will be able to return to work without restrictions. (progressing, not met)    Plan      Continue current POC ( 4 visits remaining on current auth)     Ricki Mcginnis, PT

## 2020-04-22 ENCOUNTER — CLINICAL SUPPORT (OUTPATIENT)
Dept: REHABILITATION | Facility: HOSPITAL | Age: 30
End: 2020-04-22
Payer: MEDICAID

## 2020-04-22 DIAGNOSIS — R29.898 WEAKNESS OF RIGHT LOWER EXTREMITY: ICD-10-CM

## 2020-04-22 DIAGNOSIS — M25.661 DECREASED ROM OF RIGHT KNEE: ICD-10-CM

## 2020-04-22 DIAGNOSIS — M25.561 ACUTE PAIN OF RIGHT KNEE: ICD-10-CM

## 2020-04-22 PROCEDURE — 97110 THERAPEUTIC EXERCISES: CPT | Mod: PO

## 2020-04-22 NOTE — PROGRESS NOTES
Physical Therapy Daily Treatment Note     Name: Delmy Garcia  [facility] Number: [id number]    Therapy Diagnosis:   Encounter Diagnoses   Name Primary?    Acute pain of right knee     Decreased ROM of right knee     Weakness of right lower extremity      Physician: Alfonzo Guardado MD    Visit Date: 4/22/2020  Physician Orders: PT Eval and Treat 3x/week x30 days  Medical Diagnosis from Referral: Sprain of medial collateral ligament of right knee, contusion of right knee, acute pain of right knee  Evaluation Date: 4/2/2020  Authorization Period Expiration: 3/17/21  Plan of Care Expiration: 5/29/20  Visit # / Visits authorized: 7/ 9    Time In: 0903  Time Out: 0958  Total Billable Time: 55 minutes    Precautions: Standard    Subjective     Pt reports:she had some mild soreness after last visit. She had some increased pain due to sitting for several hours while getting her hair cut.    . Walking more for exercise, but inconsistent with HEP.  Response to previous treatment: soreness   Functional change: no significant changes since last visit  Pain: 4-5/10  Location: right knee      Objective     Delmy received therapeutic exercises to develop strength, endurance, ROM, flexibility and posture for 55 minutes including:    Elliptical 10 min (took subjective, for endurance and strength)  Stationary bike x 10 min (for ROM and endurance)- not performed   LAQs 2# 2/10  Heel slides x20    SLR  2/10  Sidelying hip abduction 2#  2/10  Prone hip extension 2#  2/10  Prone knee flexion 2#  2/10  Sidelying hip adduction 2# 2/10  Bridging x20  HS curls red ball x20    Sit to stand 2/10  Machine hamstring curls 30lbs    Standing heel raises 2x10     Lateral step ups 6 in 2//10: not performed  Forward step ups 6 in 2/10: not performed  Lateral walking with RTB at ankles 25 ft x 4  Monster walk RTB at ankles 25'x4 not performed  Walk the rails RTB at ankles 25'x4  Leg press 20# x20    Kineso tape for medial patella glide    Home  Exercises Provided and Patient Education Provided     Education provided:   - importance of HEP compliance  - purpose of kineso tap    Written Home Exercises Provided: Patient instructed to cont prior HEP.  Exercises were reviewed and Delmy was able to demonstrate them prior to the end of the session.  Delmy demonstrated good  understanding of the education provided.     See EMR under Patient Instructions for exercises provided 04/02/2020.    Assessment     Patient has made limited progress to this point so I added kineso taping to see if that would help decrease her pain and improve function. I will reass the benenfit of taping on next visit.     Delmy is slow ly progressing towards her goals.   Pt prognosis is Excellent.     Pt will continue to benefit from skilled outpatient physical therapy to address the deficits listed in the problem list box on initial evaluation, provide pt/family education and to maximize pt's level of independence in the home and community environment.     Pt's spiritual, cultural and educational needs considered and pt agreeable to plan of care and goals.    Anticipated barriers to physical therapy: none    Goals:   Short Term Goals: 2 weeks   Pt will have full active extension of right knee and 125 degrees flexion for improved gait and sitting. MET 4/9/20  Pt will increase right LE strength by 1 grade for minimal limitations with walking. MET 4/13/20  Pt will have safe and symmetrical gait for 1/2 mile. MET 4/9/20     Long Term Goals: 4 weeks   Pt will have full ROM of right knee for no limitations in ADLs (progressing, not met)  Pt will have 5/5 strength of right LE for return to work. (progressing, not met)  Pt will be able to return to work without restrictions. (progressing, not met)    Plan     Continue current POC ( 4 visits remaining on current auth)     Ricki Mcginnis, PT

## 2020-04-27 ENCOUNTER — CLINICAL SUPPORT (OUTPATIENT)
Dept: REHABILITATION | Facility: HOSPITAL | Age: 30
End: 2020-04-27
Attending: PREVENTIVE MEDICINE
Payer: MEDICAID

## 2020-04-27 DIAGNOSIS — M25.661 DECREASED ROM OF RIGHT KNEE: ICD-10-CM

## 2020-04-27 DIAGNOSIS — M25.561 ACUTE PAIN OF RIGHT KNEE: ICD-10-CM

## 2020-04-27 DIAGNOSIS — R29.898 WEAKNESS OF RIGHT LOWER EXTREMITY: ICD-10-CM

## 2020-04-27 PROCEDURE — 97110 THERAPEUTIC EXERCISES: CPT | Mod: PO

## 2020-04-27 NOTE — PROGRESS NOTES
Physical Therapy Daily Treatment Note     Name: Delmy Garcia  [facility] Number: [id number]    Therapy Diagnosis:   Encounter Diagnoses   Name Primary?    Acute pain of right knee     Decreased ROM of right knee     Weakness of right lower extremity      Physician: Alfonzo Guardado MD    Visit Date: 4/27/2020  Physician Orders: PT Eval and Treat 3x/week x30 days  Medical Diagnosis from Referral: Sprain of medial collateral ligament of right knee, contusion of right knee, acute pain of right knee  Evaluation Date: 4/2/2020  Authorization Period Expiration: 3/17/21  Plan of Care Expiration: 5/29/20  Visit # / Visits authorized: 8/ 9    Time In: 0905  Time Out: 1000  Total Billable Time: 55 minutes    Precautions: Standard    Subjective     Pt reports: she felt better with the tape on but once the tape came off she was having some increased pain for that day. She feels about the same as normal today with pain in the same area..     . Walking more for exercise, but inconsistent with HEP.  Response to previous treatment: soreness   Functional change: able to move better with the tape on  Pain: 5/10  Location: right knee      Objective     Delmy received therapeutic exercises to develop strength, endurance, ROM, flexibility and posture for 55 minutes including:    Elliptical 10 min (took subjective, for endurance and strength)  Stationary bike x 10 min (for ROM and endurance)- not performed   Heel slides x20  SLR  2/10  Sidelying hip adduction 2#  2/10  Prone hip extension 2#  2/10  Prone knee flexion 2# x 15  Bridging x20  HS curls red ball x20  Machine hamstring curls 30lbs, 2 x 10  Standing heel raises 2x10   Lateral step ups 6 in  X /10:   Forward step ups 6 in 2/10: not perfored  Lateral walking with RTB at ankles 25 ft x 4: not performed  Monster walk RTB at ankles 25'x4 not performed  Shuttle squats , 1 x 10: 50lbs, 1 x 10 75lbs  Machine: standing Hip abd : x 15, bilateral  LAQ x 15    Kineso tape for medial  patella glide    Home Exercises Provided and Patient Education Provided     Education provided:   - importance of HEP compliance  - purpose of kineso tap    Written Home Exercises Provided: Patient instructed to cont prior HEP.  Exercises were reviewed and Delmy was able to demonstrate them prior to the end of the session.  Delmy demonstrated good  understanding of the education provided.     See EMR under Patient Instructions for exercises provided 04/02/2020.    Assessment     Patient had some improvement with the kineso tape and probably had some increased pain after taking it off more due to the increase in activity she performed while it was on vs the tape it self.  I applied the tape again today to see how she does with it this week. She should be seeing some overall improvement due to time and exercise over the next few weeks. She does have Full ROM but continues to have pain at end garo flexion.      Delmy is slow ly progressing towards her goals.   Pt prognosis is Excellent.     Pt will continue to benefit from skilled outpatient physical therapy to address the deficits listed in the problem list box on initial evaluation, provide pt/family education and to maximize pt's level of independence in the home and community environment.     Pt's spiritual, cultural and educational needs considered and pt agreeable to plan of care and goals.    Anticipated barriers to physical therapy: none    Goals:   Short Term Goals: 2 weeks   Pt will have full active extension of right knee and 125 degrees flexion for improved gait and sitting. MET 4/9/20  Pt will increase right LE strength by 1 grade for minimal limitations with walking. MET 4/13/20  Pt will have safe and symmetrical gait for 1/2 mile. MET 4/9/20     Long Term Goals: 4 weeks   Pt will have full ROM of right knee for no limitations in ADLs (progressing, not met)  Pt will have 5/5 strength of right LE for return to work. (progressing, not met)  Pt will be  able to return to work without restrictions. (progressing, not met)    Plan     Continue current POC    Ricki Mcginnis, PT

## 2020-04-28 ENCOUNTER — OFFICE VISIT (OUTPATIENT)
Dept: URGENT CARE | Facility: CLINIC | Age: 30
End: 2020-04-28
Payer: COMMERCIAL

## 2020-04-28 DIAGNOSIS — M25.561 ACUTE PAIN OF RIGHT KNEE: ICD-10-CM

## 2020-04-28 DIAGNOSIS — S80.01XD CONTUSION OF RIGHT KNEE, SUBSEQUENT ENCOUNTER: ICD-10-CM

## 2020-04-28 DIAGNOSIS — S83.411D SPRAIN OF MEDIAL COLLATERAL LIGAMENT OF RIGHT KNEE, SUBSEQUENT ENCOUNTER: Primary | ICD-10-CM
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
## 2020-04-28 PROCEDURE — 99214 PR OFFICE/OUTPT VISIT, EST, LEVL IV, 30-39 MIN: ICD-10-PCS | Mod: S$GLB,,, | Performed by: PREVENTIVE MEDICINE

## 2020-04-28 PROCEDURE — 99214 OFFICE O/P EST MOD 30 MIN: CPT | Mod: S$GLB,,, | Performed by: PREVENTIVE MEDICINE

## 2020-04-28 NOTE — PROGRESS NOTES
Subjective:       Patient ID: Delmy Garcia is a 29 y.o. female.    Chief Complaint: Knee Injury (right)    , RIGHT KNEE, DOI:  02/13/2020  PATIENT IS CONTINUING PHYSICAL THERAPY. SHE FEELS LIKE IT IS GETTING BETTER SLOWLY.   CS    Knee Injury   This is a new problem. The current episode started more than 1 month ago. The problem occurs intermittently. The problem has been gradually improving. Associated symptoms include arthralgias. Pertinent negatives include no chest pain, chills, congestion, coughing, fatigue, fever, headaches, joint swelling, myalgias, nausea, rash, sore throat, vertigo, vomiting or weakness. The symptoms are aggravated by exertion and standing (sitting). She has tried ice for the symptoms. The treatment provided moderate relief.       Constitution: Negative for chills, fatigue and fever.   HENT: Negative for congestion and sore throat.    Neck: Negative for painful lymph nodes.   Cardiovascular: Negative for chest pain and leg swelling.   Eyes: Negative for double vision and blurred vision.   Respiratory: Negative for cough and shortness of breath.    Gastrointestinal: Negative for nausea, vomiting and diarrhea.   Genitourinary: Negative for dysuria, frequency, urgency and history of kidney stones.   Musculoskeletal: Positive for joint pain. Negative for joint swelling, muscle cramps and muscle ache.   Skin: Negative for color change, pale, rash and bruising.   Allergic/Immunologic: Negative for seasonal allergies.   Neurological: Negative for dizziness, history of vertigo, light-headedness, passing out and headaches.   Hematologic/Lymphatic: Negative for swollen lymph nodes.   Psychiatric/Behavioral: Negative for nervous/anxious, sleep disturbance and depression. The patient is not nervous/anxious.         Objective:      Physical Exam   Constitutional: She appears well-developed and well-nourished.   HENT:   Head: Normocephalic.   Eyes: Pupils are equal, round, and reactive to  light.   Neck: Normal range of motion.   Cardiovascular: Normal rate.   Pulmonary/Chest: Effort normal.   Musculoskeletal:        Right knee: She exhibits decreased range of motion. She exhibits no swelling, no effusion, no ecchymosis, no deformity, no laceration, no erythema, normal alignment and no LCL laxity. Tenderness found. No medial joint line, no lateral joint line, no MCL, no LCL and no patellar tendon tenderness noted.        Lumbar back: She exhibits no bony tenderness, no swelling, no edema, no deformity, no laceration, no spasm and normal pulse.        Legs:  Less pain and swelling about right knee with both palpation and range of motion testing.  She has pain with flexion of right knee to 90°, extension to 180°.  Anterior posterior drawer signs are negative but with complaints of pain.  She also has complaints of pain with Shahram's test which is negative.  Distal pulses are equal and intact.  No motor sensory deficits about the right lower extremity.   Neurological: She is alert.   No focal neurologic deficits   Skin: Skin is warm.   Psychiatric: She has a normal mood and affect.   Nursing note and vitals reviewed.      Assessment:       1. Sprain of medial collateral ligament of right knee, subsequent encounter    2. Contusion of right knee, subsequent encounter    3. Acute pain of right knee    4. Work related injury        Plan:     patient has tolerated increasing her activities with physical therapy and exercises at home.  She will continue with ibuprofen 800 mg as needed for pain about her right knee.  She will begin heel, toe walking exercises at home as well as in physical therapy.       Patient Instructions: Daily home exercises/warm soaks, Continue Physical Therapy(Patient to continue physical therapy at an [facility] location with increasing range-of-motion exercises daily.)   Restrictions: Sit or stand as needed  Follow up in about 1 week (around 5/5/2020).

## 2020-04-28 NOTE — LETTER
[facility]  [address], [address]  [place] [address]  Phone: [phone]  Fax: [phone]  [facility]: [email or web address]    Pt Name: Delmy Garcia  Injury Date: 02/13/2020   Employee ID:  Date of Treatment: 04/28/2020   Company: [employer]      Appointment Time: 11:45 AM Arrived: 11:43  AM   Provider: Alfonzo Guardado MD Time Out:12:42  PM     Office Treatment:   EXAM  CONTINUE PT  LIGHT DUTY      1. Sprain of medial collateral ligament of right knee, subsequent encounter    2. Contusion of right knee, subsequent encounter    3. Acute pain of right knee    4. Work related injury          Patient Instructions: Daily home exercises/warm soaks, Continue Physical Therapy(Patient to continue physical therapy at an [facility] location with increasing range-of-motion exercises daily.)      Restrictions: Sit or stand as needed     Return Appointment: 5/5/2020 at 1:00 PM

## 2020-05-05 ENCOUNTER — OFFICE VISIT (OUTPATIENT)
Dept: URGENT CARE | Facility: CLINIC | Age: 30
End: 2020-05-05
Payer: COMMERCIAL

## 2020-05-05 VITALS — OXYGEN SATURATION: 96 % | TEMPERATURE: 98 F

## 2020-05-05 DIAGNOSIS — S83.411D SPRAIN OF MEDIAL COLLATERAL LIGAMENT OF RIGHT KNEE, SUBSEQUENT ENCOUNTER: Primary | ICD-10-CM

## 2020-05-05 DIAGNOSIS — S80.01XD CONTUSION OF RIGHT KNEE, SUBSEQUENT ENCOUNTER: ICD-10-CM

## 2020-05-05 DIAGNOSIS — M25.561 ACUTE PAIN OF RIGHT KNEE: ICD-10-CM

## 2020-05-05 PROCEDURE — 99214 PR OFFICE/OUTPT VISIT, EST, LEVL IV, 30-39 MIN: ICD-10-PCS | Mod: S$GLB,,, | Performed by: PREVENTIVE MEDICINE

## 2020-05-05 PROCEDURE — 99214 OFFICE O/P EST MOD 30 MIN: CPT | Mod: S$GLB,,, | Performed by: PREVENTIVE MEDICINE

## 2020-05-05 NOTE — LETTER
[facility]  [address], [address]  [place] [address]  Phone: [phone]  Fax: [phone]  [facility]: [email or web address]    Pt Name: Delmy Garcia  Injury Date: 02/13/2020   Employee ID: [id number] Date of Treatment: 05/05/2020   Company: [employer]      Appointment Time: 12:45 PM Arrived: 11:40 a.m.   Provider: Alfonzo Guardado MD Time Out: 12:50 p.m.     Office Treatment:   EXAM  Regular Duty (May resume regular work on May 11th 2020)     1. Sprain of medial collateral ligament of right knee, subsequent encounter    2. Contusion of right knee, subsequent encounter    3. Acute pain of right knee          Patient Instructions: Daily home exercises/warm soaks    Restrictions: Regular Duty(May resume regular work on May 11th 2020)     Return Appointment: 5/26/2020 at 11:30a.m.  [place]

## 2020-05-05 NOTE — PROGRESS NOTES
Subjective:       Patient ID: Delmy Garcia is a 29 y.o. female.    Chief Complaint: Knee Injury (RT)     Follow-up of RT Knee Injury ( DOI 02- ) Pain score today is 3-4/10 with complaints of Intermittent Aching pain with use but getting better slowly, No numbness/tingling or swelling. Doing Daily home exercises with warm soaks, Hasn't gone to PT for the last 2 Fridays (closed). [facility]      Constitution: Negative for chills, fatigue and fever.   HENT: Negative for congestion and sore throat.    Neck: Negative for painful lymph nodes.   Cardiovascular: Negative for chest pain and leg swelling.   Eyes: Negative for double vision and blurred vision.   Respiratory: Negative for cough and shortness of breath.    Gastrointestinal: Negative for nausea, vomiting and diarrhea.   Genitourinary: Negative for dysuria, frequency, urgency and history of kidney stones.   Musculoskeletal: Positive for pain, joint pain and muscle ache. Negative for joint swelling and muscle cramps.   Skin: Negative for color change, pale, rash and bruising.   Allergic/Immunologic: Negative for seasonal allergies.   Neurological: Negative for dizziness, history of vertigo, light-headedness, passing out, headaches, numbness and tingling.   Hematologic/Lymphatic: Negative for swollen lymph nodes.   Psychiatric/Behavioral: Negative for nervous/anxious, sleep disturbance and depression. The patient is not nervous/anxious.         Objective:      Physical Exam   Constitutional: She appears well-developed and well-nourished.   HENT:   Head: Normocephalic.   Eyes: Pupils are equal, round, and reactive to light.   Neck: Normal range of motion.   Cardiovascular: Normal rate.   Pulmonary/Chest: Effort normal.   Musculoskeletal:        Right knee: She exhibits decreased range of motion. She exhibits no swelling, no effusion, no ecchymosis, no deformity, no laceration, no erythema, normal alignment and no LCL laxity. Tenderness found. No medial joint  line, no lateral joint line, no MCL, no LCL and no patellar tendon tenderness noted.        Lumbar back: She exhibits no bony tenderness, no swelling, no edema, no deformity, no laceration, no spasm and normal pulse.        Legs:  Less pain and swelling about right knee with both palpation and range of motion testing.  She has pain with flexion of right knee to 90°, extension to 180°.  Anterior posterior drawer signs are negative but with complaints of pain.  She also has complaints of pain with Shahram's test which is negative.  Distal pulses are equal and intact.  No motor sensory deficits about the right lower extremity.   Neurological: She is alert.   No focal neurologic deficits   Skin: Skin is warm.   Psychiatric: She has a normal mood and affect.   Nursing note and vitals reviewed.      Assessment:       1. Sprain of medial collateral ligament of right knee, subsequent encounter    2. Contusion of right knee, subsequent encounter    3. Acute pain of right knee        Plan:     patient has been doing exercises for her right knee as demonstrated in the office.  She stop getting physical therapy last week and had no further follow-up there.  Based on a review of her physical assessment, she is now able to resume her regular duty without restriction.  She will continue taking ibuprofen 800 mg as needed for knee pain.       Patient Instructions: Daily home exercises/warm soaks   Restrictions: Regular Duty(May resume regular work on May 11th 2020)  Follow up in about 3 weeks (around 5/26/2020).

## 2020-06-02 ENCOUNTER — TELEPHONE (OUTPATIENT)
Dept: URGENT CARE | Facility: CLINIC | Age: 30
End: 2020-06-02

## 2020-07-27 ENCOUNTER — OFFICE VISIT (OUTPATIENT)
Dept: URGENT CARE | Facility: CLINIC | Age: 30
End: 2020-07-27
Payer: COMMERCIAL

## 2020-07-27 DIAGNOSIS — S80.01XD CONTUSION OF RIGHT KNEE, SUBSEQUENT ENCOUNTER: Primary | ICD-10-CM

## 2020-07-27 DIAGNOSIS — S83.411D SPRAIN OF MEDIAL COLLATERAL LIGAMENT OF RIGHT KNEE, SUBSEQUENT ENCOUNTER: ICD-10-CM

## 2020-07-27 DIAGNOSIS — M25.561 ACUTE PAIN OF RIGHT KNEE: ICD-10-CM

## 2020-07-27 PROCEDURE — 99214 OFFICE O/P EST MOD 30 MIN: CPT | Mod: S$GLB,,, | Performed by: PREVENTIVE MEDICINE

## 2020-07-27 PROCEDURE — 99214 PR OFFICE/OUTPT VISIT, EST, LEVL IV, 30-39 MIN: ICD-10-PCS | Mod: S$GLB,,, | Performed by: PREVENTIVE MEDICINE

## 2020-07-27 NOTE — PROGRESS NOTES
Subjective:       Patient ID: Delmy Garcia is a 30 y.o. female.    Chief Complaint: Knee Injury (RT)     Follow-up of RT Knee Injury ( DOI 02- ) Pain score of 8/10 with complaints of Constant Aching/Stabbing/Burning pain, Hasn't been back to work but has been watching a 2yr old and with increased activity her knee pain has increased, No numbness/tingling, Swelling. SH    Knee Injury  Associated symptoms include arthralgias, joint swelling and myalgias. Pertinent negatives include no chest pain, chills, congestion, coughing, fatigue, fever, headaches, nausea, numbness, rash, sore throat, vertigo, vomiting or weakness.       Constitution: Negative for chills, fatigue and fever.   HENT: Negative for congestion and sore throat.    Neck: Negative for painful lymph nodes.   Cardiovascular: Negative for chest pain and leg swelling.   Eyes: Negative for double vision and blurred vision.   Respiratory: Negative for cough and shortness of breath.    Gastrointestinal: Negative for nausea, vomiting and diarrhea.   Genitourinary: Negative for dysuria, frequency, urgency and history of kidney stones.   Musculoskeletal: Positive for joint pain, joint swelling and muscle ache. Negative for muscle cramps.   Skin: Negative for color change, pale, rash and bruising.   Allergic/Immunologic: Negative for seasonal allergies.   Neurological: Negative for dizziness, history of vertigo, light-headedness, passing out, headaches, numbness and tingling.   Hematologic/Lymphatic: Negative for swollen lymph nodes.   Psychiatric/Behavioral: Negative for nervous/anxious, sleep disturbance and depression. The patient is not nervous/anxious.         Objective:      Physical Exam  Vitals signs and nursing note reviewed.   Constitutional:       Appearance: She is well-developed.   HENT:      Head: Normocephalic.   Eyes:      Pupils: Pupils are equal, round, and reactive to light.   Neck:      Musculoskeletal: Normal range of motion.    Cardiovascular:      Rate and Rhythm: Normal rate.   Pulmonary:      Effort: Pulmonary effort is normal.   Musculoskeletal:      Right knee: She exhibits decreased range of motion. She exhibits no swelling, no effusion, no ecchymosis, no deformity, no laceration, no erythema, normal alignment and no LCL laxity. Tenderness found. No medial joint line, no lateral joint line, no MCL, no LCL and no patellar tendon tenderness noted.      Lumbar back: She exhibits no bony tenderness, no swelling, no edema, no deformity, no laceration, no spasm and normal pulse.        Legs:       Comments: Patient continues to complain of pain with both palpation and range of motion testing of her right knee.  She has pain with full flexion and extension of her right knee without evidence of crepitus or deformity.  She has pain with both anterior and posterior drawer signs but no evidence of laxity or other abnormality.  Shahram's sign is negative.  Distal pulses are equal intact.  Her motor and sensory deficits are otherwise normal about her lower extremity.   Skin:     General: Skin is warm.   Neurological:      Mental Status: She is alert.      Comments: No focal neurologic deficits         Assessment:       1. Contusion of right knee, subsequent encounter    2. Acute pain of right knee    3. Sprain of medial collateral ligament of right knee, subsequent encounter        Plan:     once again discussed the results of the MRI of the right knee which revealed no abnormalities requiring surgical intervention.  Patient understood  that these results indicate that she should continue exercises for her right knee with warm soaks on a daily basis.  She needs to improve her conditioning but is now able to return to regular duty without restrictions.  She may return to this office as needed.  She will use over-the-counter ibuprofen and or Aleve as needed for pain       Patient Instructions: Daily home exercises/warm soaks   Restrictions:  Regular Duty, Discharged from Occupational Health  Follow up if symptoms worsen or fail to improve.

## 2020-07-27 NOTE — LETTER
[facility] [place]  [address] [facility], [address]  [facility] [address]  Phone: [phone]  Fax: [phone]  [facility]: [id number]    Pt Name: Delmy Garcia  Injury Date: 02/13/2020   Employee ID: [id number] Date of Treatment: 07/27/2020   Company: [employer]      Appointment Time: 08:30 AM Arrived: 8:30 AM   Provider: Alfonzo Guardado MD Time Out: 9:36 AM     Office Treatment:   1. Contusion of right knee, subsequent encounter    2. Acute pain of right knee    3. Sprain of medial collateral ligament of right knee, subsequent encounter          Patient Instructions: Daily home exercises/warm soaks    Restrictions: None, Regular Duty, Discharged from Occupational Health     KD

## 2020-08-24 PROBLEM — M25.661 DECREASED ROM OF RIGHT KNEE: Status: RESOLVED | Noted: 2020-04-02 | Resolved: 2020-08-24

## 2020-08-24 PROBLEM — M25.561 ACUTE PAIN OF RIGHT KNEE: Status: RESOLVED | Noted: 2020-04-02 | Resolved: 2020-08-24

## 2020-08-24 PROBLEM — R29.898 WEAKNESS OF RIGHT LOWER EXTREMITY: Status: RESOLVED | Noted: 2020-04-02 | Resolved: 2020-08-24

## 2020-11-04 ENCOUNTER — OFFICE VISIT (OUTPATIENT)
Dept: URGENT CARE | Facility: CLINIC | Age: 30
End: 2020-11-04
Payer: COMMERCIAL

## 2020-11-04 VITALS
DIASTOLIC BLOOD PRESSURE: 76 MMHG | TEMPERATURE: 98 F | OXYGEN SATURATION: 100 % | HEART RATE: 73 BPM | BODY MASS INDEX: 21.53 KG/M2 | SYSTOLIC BLOOD PRESSURE: 131 MMHG | RESPIRATION RATE: 16 BRPM | WEIGHT: 134 LBS | HEIGHT: 66 IN

## 2020-11-04 DIAGNOSIS — S80.01XD CONTUSION OF RIGHT KNEE, SUBSEQUENT ENCOUNTER: Primary | ICD-10-CM

## 2020-11-04 DIAGNOSIS — M25.561 RIGHT KNEE PAIN, UNSPECIFIED CHRONICITY: ICD-10-CM
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
## 2020-11-04 PROCEDURE — 99214 PR OFFICE/OUTPT VISIT, EST, LEVL IV, 30-39 MIN: ICD-10-PCS | Mod: S$GLB,,, | Performed by: FAMILY MEDICINE

## 2020-11-04 PROCEDURE — 99214 OFFICE O/P EST MOD 30 MIN: CPT | Mod: S$GLB,,, | Performed by: FAMILY MEDICINE

## 2020-11-04 RX ORDER — PREDNISONE 20 MG/1
TABLET ORAL
Qty: 10 TABLET | Refills: 0 | Status: SHIPPED | OUTPATIENT
Start: 2020-11-04

## 2020-11-04 NOTE — PROGRESS NOTES
Subjective:       Patient ID: Delmy Garcia is a 30 y.o. female.    Chief Complaint: Knee Pain    W/c follow up for Right knee pain February 13, 2020 at [facility]. Pain 4/10    Knee Pain   The incident occurred more than 1 week ago. The incident occurred at work. The injury mechanism was a direct blow. The pain is present in the right knee. The pain is at a severity of 4/10. The pain is mild. The pain has been constant since onset. Associated symptoms include an inability to bear weight. Pertinent negatives include no loss of motion, loss of sensation, muscle weakness, numbness or tingling. The symptoms are aggravated by movement and weight bearing. She has tried heat and ice for the symptoms. The treatment provided no relief.       Constitution: Negative for chills, fatigue and fever.   HENT: Negative for congestion and sore throat.    Neck: Negative for painful lymph nodes.   Cardiovascular: Negative for chest pain and leg swelling.   Eyes: Negative for double vision and blurred vision.   Respiratory: Negative for cough and shortness of breath.    Gastrointestinal: Negative for nausea, vomiting and diarrhea.   Genitourinary: Negative for dysuria, frequency, urgency and history of kidney stones.   Musculoskeletal: Positive for pain. Negative for joint pain, joint swelling, muscle cramps and muscle ache.   Skin: Negative for color change, pale, rash and bruising.   Allergic/Immunologic: Negative for seasonal allergies.   Neurological: Negative for dizziness, history of vertigo, light-headedness, passing out, headaches and numbness.   Hematologic/Lymphatic: Negative for swollen lymph nodes.   Psychiatric/Behavioral: Negative for nervous/anxious, sleep disturbance and depression. The patient is not nervous/anxious.         Objective:      Physical Exam  Musculoskeletal:         General: Tenderness and signs of injury present.      Right knee: She exhibits no swelling and no effusion. Tenderness found.         "Legs:          Assessment:       1. Contusion of right knee, subsequent encounter    2. Work related injury    3. Right knee pain, unspecified chronicity        Plan:     pain reported out of proportion to PE findings. Pt right knee is limited in flexion compared to left; pt does have some hesitation during exam of ext. Pt can barely squat to 90' Pt hesitant to perform thessaly due to fear of hurting. Pt able to arise from seated position without issue. States that was better with PT but has not been performing maintenance at home "bc walk at work all day" feels like something is wrong and favors the extremity when standing. Will restart PT- emphasized HEP. Will try course of steroids as it has not been attempted. If not improvement with PT, will refer to ortho- as pt has requested. Reviewed MRI results with pt and reiterated that no intrinsic injury to joint, meniscus or ligaments- only soft tissue findings    Medications Ordered This Encounter   Medications    predniSONE (DELTASONE) 20 MG tablet     Sig: Take 40mg x2 days, 30 mg x2 days, 20mg x2 days, 10mg x2 days     Dispense:  10 tablet     Refill:  0     Patient Instructions: Attention not to aggravate affected area, Daily home exercises/warm soaks, PT to be scheduled once authorized   Restrictions: (sit or stand as needed during the day while working.)  No follow-ups on file.        Of note- pt returned to clinic a couple hrs after d/c and stated that she was in too much pain from her visit/exam to go back to work today and would like note to state she can return [place].   "

## 2020-11-04 NOTE — LETTER
[facility] [place] [address], [address]  [facility] [address]  Phone: [phone]  Fax: [phone]  [facility]: [id number]    Pt Name: Delmy Garcia  Injury Date: 02/13/2020   Employee ID: [id number] Date of  Treatment: 11/04/2020   Company: [employer]      Appointment Time: 08:45 AM Arrived: 845   Provider: Robles Ruiz MD Time Out:950     Office Treatment:   1. Contusion of right knee, subsequent encounter    2. Work related injury    3. Right knee pain, unspecified chronicity      Medications Ordered This Encounter   Medications    predniSONE (DELTASONE) 20 MG tablet      Patient Instructions: Attention not to aggravate affected area, Daily home exercises/warm soaks, PT to be scheduled once authorized    Restrictions: (sit or stand as needed during the day while working.) Return to work next schedule shift on 11/5    You have a work related injury. Medical care and treatment required as a result of a work-related injury  should be focused on restoring functional ability required to meet the patient's daily and work activities and return to work, while striving to restore the patient's health to its pre-injury status in so far as is feasible.  Some OTC measures to help in recovery(if no allergies to or renal issues):  Tylenol 325mg 3x per day  Ibuprofen 400mg 3x per day OR Aleve regular strength one tablet 2x per day  Take Pepcid 20mg BID  Magnesium OTC daily  Massage area if possible  Resting of the injured area  Ice for ankle, wrist or elbow injury  Elevation of the injured area if applicable  Heating pad for muscle injury  Stretching/ROM exercises as described in clinic.            Return Appointment: 11/18/2020 at 9a

## 2020-11-04 NOTE — LETTER
[facility] [place]  [address], [address]  [facility] [address]  Phone: [phone]  Fax: [phone]  [facility]: [id number]    Pt Name: Delmy Garcia  Injury Date: 02/13/2020   Employee ID: [id number] Date of  Treatment: 11/04/2020   Company: [employer]      Appointment Time: 08:45 AM Arrived: 845   Provider: Robles Ruiz MD Time Out:950     Office Treatment:   1. Contusion of right knee, subsequent encounter    2. Work related injury    3. Right knee pain, unspecified chronicity      Medications Ordered This Encounter   Medications    predniSONE (DELTASONE) 20 MG tablet      Patient Instructions: Attention not to aggravate affected area, Daily home exercises/warm soaks, PT to be scheduled once authorized    Restrictions: (sit or stand as needed during the day while working.)     You have a work related injury. Medical care and treatment required as a result of a work-related injury  should be focused on restoring functional ability required to meet the patient's daily and work activities and return to work, while striving to restore the patient's health to its pre-injury status in so far as is feasible.  Some OTC measures to help in recovery(if no allergies to or renal issues):  Tylenol 325mg 3x per day  Ibuprofen 400mg 3x per day OR Aleve regular strength one tablet 2x per day  Take Pepcid 20mg BID  Magnesium OTC daily  Massage area if possible  Resting of the injured area  Ice for ankle, wrist or elbow injury  Elevation of the injured area if applicable  Heating pad for muscle injury  Stretching/ROM exercises as described in clinic.        Return Appointment: 11/18 at 9am

## 2020-11-18 ENCOUNTER — OFFICE VISIT (OUTPATIENT)
Dept: URGENT CARE | Facility: CLINIC | Age: 30
End: 2020-11-18
Payer: COMMERCIAL

## 2020-11-18 VITALS
DIASTOLIC BLOOD PRESSURE: 86 MMHG | SYSTOLIC BLOOD PRESSURE: 136 MMHG | BODY MASS INDEX: 21.53 KG/M2 | TEMPERATURE: 98 F | OXYGEN SATURATION: 99 % | WEIGHT: 134 LBS | HEIGHT: 66 IN | HEART RATE: 73 BPM | RESPIRATION RATE: 16 BRPM

## 2020-11-18 DIAGNOSIS — M25.561 RIGHT KNEE PAIN, UNSPECIFIED CHRONICITY: ICD-10-CM
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
## 2020-11-18 DIAGNOSIS — S80.01XD CONTUSION OF RIGHT KNEE, SUBSEQUENT ENCOUNTER: ICD-10-CM

## 2020-11-18 PROCEDURE — 99214 PR OFFICE/OUTPT VISIT, EST, LEVL IV, 30-39 MIN: ICD-10-PCS | Mod: S$GLB,,, | Performed by: FAMILY MEDICINE

## 2020-11-18 PROCEDURE — 99214 OFFICE O/P EST MOD 30 MIN: CPT | Mod: S$GLB,,, | Performed by: FAMILY MEDICINE

## 2020-11-18 NOTE — PROGRESS NOTES
Subjective:       Patient ID: Delmy Garcia is a 30 y.o. female.    Chief Complaint: Knee Pain    Pt presents to urgent care for a w/c follow up.  She works for "[employer].  She states that she was up all night in pain.  Pain scale last night was a 6.  She took a tylenol and it soothed the pain just a little bit.  Pain scale today- 6.  She has not been called about any kind of therapy that needs to be done.     Knee Pain   The incident occurred more than 1 week ago. The incident occurred at work. The pain is present in the right knee. The pain is at a severity of 6/10. The pain is moderate. The pain has been constant since onset. Nothing aggravates the symptoms. She has tried nothing for the symptoms. The treatment provided no relief.       Constitution: Negative for chills, fatigue and fever.   HENT: Negative for congestion and sore throat.    Neck: Negative for painful lymph nodes.   Cardiovascular: Negative for chest pain and leg swelling.   Eyes: Negative for double vision and blurred vision.   Respiratory: Negative for cough and shortness of breath.    Gastrointestinal: Negative for nausea, vomiting and diarrhea.   Genitourinary: Negative for dysuria, frequency, urgency and history of kidney stones.   Musculoskeletal: Negative for joint pain, joint swelling, muscle cramps and muscle ache.   Skin: Negative for color change, pale, rash and bruising.   Allergic/Immunologic: Negative for seasonal allergies.   Neurological: Negative for dizziness, history of vertigo, light-headedness, passing out and headaches.   Hematologic/Lymphatic: Negative for swollen lymph nodes.   Psychiatric/Behavioral: Negative for nervous/anxious, sleep disturbance and depression. The patient is not nervous/anxious.         Objective:      Physical Exam  Musculoskeletal: Normal range of motion.         General: Tenderness and signs of injury present.        Legs:            Assessment:       1. Work related injury    2. Contusion of  right knee, subsequent encounter    3. Right knee pain, unspecified chronicity        Plan:       Ongoing issue for several months- sx waxing and waning- did not start therapy yet. - subjective sx seem to be out of proportion to the the objective findings. Will send to ortho     Patient Instructions: Attention not to aggravate affected area, Referral to specialist to be scheduled, once authorized   Restrictions: (continue current)  No follow-ups on file.    Work related injury  -     Ambulatory referral/consult to Orthopedics    Contusion of right knee, subsequent encounter  -     Ambulatory referral/consult to Orthopedics    Right knee pain, unspecified chronicity  -     Ambulatory referral/consult to Orthopedics

## 2020-11-18 NOTE — LETTER
[facility] [place] [address], [address]  [facility] [address]  Phone: [phone]  Fax: [phone]  [facility]: [id number]    Pt Name: Delmy Garcia  Injury Date: 02/13/2020   Employee ID: [id number] Date of  Treatment: 11/18/2020   Company: [employer]      Appointment Time: 08:45 AM Arrived: 845   Provider: Robles Ruiz MD Time Out:1000     Office Treatment:   1. Work related injury    2. Contusion of right knee, subsequent encounter    3. Right knee pain, unspecified chronicity          Patient Instructions: Attention not to aggravate affected area, Referral to specialist to be scheduled, once authorized    Restrictions: (continue current)     You have a work related injury. Medical care and treatment required as a result of a work-related injury  should be focused on restoring functional ability required to meet the patient's daily and work activities and return to work, while striving to restore the patient's health to its pre-injury status in so far as is feasible.  Some OTC measures to help in recovery(if no allergies to or renal issues):  Tylenol 325mg 3x per day  Ibuprofen 400mg 3x per day OR Aleve regular strength one tablet 2x per day  Take Pepcid 20mg BID  Magnesium OTC daily  Massage area if possible  Resting of the injured area  Ice for ankle, wrist or elbow injury  Elevation of the injured area if applicable  Heating pad for muscle injury  Stretching/ROM exercises as described in clinic.        Return Appointment: f/u with specialist

## 2020-12-29 ENCOUNTER — OFFICE VISIT (OUTPATIENT)
Dept: URGENT CARE | Facility: CLINIC | Age: 30
End: 2020-12-29
Payer: OTHER MISCELLANEOUS

## 2020-12-29 VITALS
DIASTOLIC BLOOD PRESSURE: 84 MMHG | TEMPERATURE: 97 F | SYSTOLIC BLOOD PRESSURE: 137 MMHG | BODY MASS INDEX: 25.16 KG/M2 | WEIGHT: 166 LBS | HEIGHT: 68 IN | HEART RATE: 65 BPM | OXYGEN SATURATION: 98 %

## 2020-12-29 DIAGNOSIS — S16.1XXA CERVICAL STRAIN, ACUTE, INITIAL ENCOUNTER: Primary | ICD-10-CM
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
## 2020-12-29 PROCEDURE — 99214 PR OFFICE/OUTPT VISIT, EST, LEVL IV, 30-39 MIN: ICD-10-PCS | Mod: 25,S$GLB,, | Performed by: NURSE PRACTITIONER

## 2020-12-29 PROCEDURE — 96372 THER/PROPH/DIAG INJ SC/IM: CPT | Mod: S$GLB,,, | Performed by: NURSE PRACTITIONER

## 2020-12-29 PROCEDURE — 99214 OFFICE O/P EST MOD 30 MIN: CPT | Mod: 25,S$GLB,, | Performed by: NURSE PRACTITIONER

## 2020-12-29 PROCEDURE — 96372 PR INJECTION,THERAP/PROPH/DIAG2ST, IM OR SUBCUT: ICD-10-PCS | Mod: S$GLB,,, | Performed by: NURSE PRACTITIONER

## 2020-12-29 RX ORDER — METHOCARBAMOL 500 MG/1
1000 TABLET, FILM COATED ORAL 3 TIMES DAILY PRN
Qty: 30 TABLET | Refills: 0 | Status: SHIPPED | OUTPATIENT
Start: 2020-12-29 | End: 2021-01-08

## 2020-12-29 RX ORDER — DEXAMETHASONE SODIUM PHOSPHATE 4 MG/ML
8 INJECTION, SOLUTION INTRA-ARTICULAR; INTRALESIONAL; INTRAMUSCULAR; INTRAVENOUS; SOFT TISSUE
Status: COMPLETED | OUTPATIENT
Start: 2020-12-29 | End: 2020-12-29

## 2020-12-29 RX ORDER — DICLOFENAC SODIUM 50 MG/1
50 TABLET, DELAYED RELEASE ORAL 3 TIMES DAILY PRN
Qty: 30 TABLET | Refills: 0 | Status: SHIPPED | OUTPATIENT
Start: 2020-12-29

## 2020-12-29 RX ORDER — KETOROLAC TROMETHAMINE 30 MG/ML
30 INJECTION, SOLUTION INTRAMUSCULAR; INTRAVENOUS
Status: COMPLETED | OUTPATIENT
Start: 2020-12-29 | End: 2020-12-29

## 2020-12-29 RX ORDER — PREDNISONE 20 MG/1
20 TABLET ORAL 2 TIMES DAILY
Qty: 10 TABLET | Refills: 0 | Status: SHIPPED | OUTPATIENT
Start: 2020-12-29 | End: 2021-01-03

## 2020-12-29 RX ADMIN — KETOROLAC TROMETHAMINE 30 MG: 30 INJECTION, SOLUTION INTRAMUSCULAR; INTRAVENOUS at 01:12

## 2020-12-29 RX ADMIN — DEXAMETHASONE SODIUM PHOSPHATE 8 MG: 4 INJECTION, SOLUTION INTRA-ARTICULAR; INTRALESIONAL; INTRAMUSCULAR; INTRAVENOUS; SOFT TISSUE at 01:12

## 2020-12-29 NOTE — PATIENT INSTRUCTIONS
Understanding Cervical Strain    There are 7 bones (vertebrae) in the neck that are part of the spine. These are called the cervical spine. Cervical strain is a medical term for neck pain. The neck has several layers of muscles. These are connected with tendons to the cervical spine and other bones. Neck pain is often the result of injury to these muscles and tendons.  Causes of cervical strain  Different types of stress on the neck can damage muscles and tendons (soft tissues) and cause cervical strain. Cervical tissues can be damaged by:  · The neck being forced past its normal range of motion, such as in a car accident or sports injury  · Constant, low-level stress, such as from poor posture or a poorly set-up workspace  Symptoms of cervical strain  These may include:  · Neck pain or stiffness  · Pain in the shoulders or upper back  · Muscle spasms  · Headache, often starting at the base of the neck  · Irritability, difficulty concentrating, or sleeplessness  Treatment for cervical strain  This problem often gets better on its own. Treatments aim to reduce pain and inflammation and increase the range of motion of the neck. Possible treatments include:  · Over-the-counter or prescription pain medicine. These help relieve pain and inflammation.  · Stretching exercises to decrease neck stiffness.  · Massage to decrease neck stiffness.  · Cold or heat pack. These help reduce pain and swelling.  Call 911  Call emergency services right away if you have any of these:  · Face drooping or numbness  · Numbness or weakness, especially in the arms or on one side  · Slurred speech or difficulty speaking  · Blurred vision   When to call your healthcare provider  Call your healthcare provider right away if you have any of these:  · Fever of 100.4°F (38°C) or higher, or as directed  · Pain or stiffness that gets worse  · Symptoms that dont get better, or get worse  · Numbness, tingling, weakness or shooting pains into the  arms or legs  · New symptoms  Date Last Reviewed: 3/10/2016  © 9904-1022 [employer] [address] [place] [address], [place] [address]. All rights reserved. This information is not intended as a substitute for professional medical care. Always follow your healthcare professional's instructions.        Treating Strains and Sprains  Strains and sprains happen when muscles or other soft tissues near your bones stretch or tear. These injuries can cause bruising, swelling, and pain. To ease your discomfort and speed the healing of your strain or sprain, follow the tips below. Remember, a strain or sprain can take 6 to 8 weeks to heal.     Important Note: Do not give aspirin to children or teens without discussing it with your healthcare provider first.        Ice first, heat later  · Use ice for the first 24 to 48 hours after injury. Ice helps prevent swelling and reduce pain. Ice the injury for no more than 20 minutes at a time and allow at least  20 minutes between icing sessions.  · Apply heat after the first 72 hours, once the swelling has gone down. Heat relaxes muscles and increases blood flow. Soak the injured area in warm water or use a heating pad set on low for no more than 15 minutes at a time.  Wrap and elevate  · Wrap an injured limb firmly with an elastic bandage. This provides support and helps prevent swelling. Dont wear an elastic bandage overnight. Watch for tingling, numbness, or increased pain, and remove the bandage immediately if any of these occurs.  · Elevate the injured area to help reduce swelling and throbbing. Its best to raise an injured limb above the level of your heart.     Medicines  · Over-the-counter medicines such as acetaminophen or ibuprofen can help reduce pain. Some also help reduce swelling.  · Take medicine only as directed.  · Rest the area even if medicines are controlling the pain.  Rest  · Rest the injured area by not using it for 24 hours.  · When youre ready,  return slowly to your normal activities. Rest the injured area often.  · Dont use or walk on an injured limb if it hurts.  Date Last Reviewed: 9/3/2015  © 6783-4695 [employer]. [address] [place] [address], [place] [address]. All rights reserved. This information is not intended as a substitute for professional medical care. Always follow your healthcare professional's instructions.        Self-Care for Strains and Sprains  Most minor strains and sprains can be treated with self-care. Recovering from a strain or sprain may take 6 to 8 weeks. Your self-care goal is to reduce pain and immobilize the injury to speed healing.     A sprain injures ligaments (tissue that connects bones to bones).        A strain injures muscles or tendons (tissue that connects muscles to bones).   Support the injured area  Wrapping the injured area provides support for short, necessary activities. Be careful not to wrap the area too tightly. This could cut off the blood supply.  · Support a wrist, elbow, or shoulder with a sling.  · Wrap an ankle or knee with an elastic bandage.  · Tape a finger or toe to the one next to it.  Use cold and heat  Cold reduces swelling. Both cold and heat reduce pain. Heat should not be used in the initial treatment of the injury. When using cold or heat, always place a towel between the pack and your skin.  · Apply ice or a cold pack 10 to 15 minutes every hour youre awake for the first 2 days.  · After the swelling goes down, use cold or heat to control pain. Dont use heat late in the day, since it can cause swelling when youre not active.  Rest and elevate  Rest and elevation help your injury heal faster.  · Raise the injured area above your heart level.  · Keep the injured area from moving.  · Limit the use of the joint or limb.  Use medicine  · Aspirin reduces pain and swelling. (Note: Dont give aspirin to a child 18 or younger unless prescribed by the doctor.)  · Aspirin substitutes, such  as ibuprofen, can reduce pain. Some substitutes reduce swelling, too. Ask your pharmacist which substitutes you can use.  Call your doctor if:  · The injured joint wont move, or bones make a grating sound when they move.  · You cant put weight on the injured area, even after 24 hours.  · The injured body part is cold, blue, or numb.  · The joint or limb appears bent or crooked.  · Pain increases or doesnt improve in 4 days.  · When pressing along the injured area, you notice a spot that is especially painful.   Date Last Reviewed: 9/29/2015  © 4130-2036 [employer]. [address] [place] [address], [place] [address]. All rights reserved. This information is not intended as a substitute for professional medical care. Always follow your healthcare professional's instructions.
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
## 2021-01-04 ENCOUNTER — OFFICE VISIT (OUTPATIENT)
Dept: URGENT CARE | Facility: CLINIC | Age: 31
End: 2021-01-04
Payer: OTHER MISCELLANEOUS

## 2021-01-04 VITALS
HEIGHT: 66 IN | SYSTOLIC BLOOD PRESSURE: 111 MMHG | TEMPERATURE: 98 F | DIASTOLIC BLOOD PRESSURE: 67 MMHG | HEART RATE: 71 BPM | BODY MASS INDEX: 26.36 KG/M2 | WEIGHT: 164 LBS

## 2021-01-04 DIAGNOSIS — S16.1XXD ACUTE STRAIN OF NECK MUSCLE, SUBSEQUENT ENCOUNTER: Primary | ICD-10-CM

## 2021-01-04 PROCEDURE — 99214 PR OFFICE/OUTPT VISIT, EST, LEVL IV, 30-39 MIN: ICD-10-PCS | Mod: S$GLB,,, | Performed by: NURSE PRACTITIONER

## 2021-01-04 PROCEDURE — 99214 OFFICE O/P EST MOD 30 MIN: CPT | Mod: S$GLB,,, | Performed by: NURSE PRACTITIONER

## 2021-01-06 ENCOUNTER — OFFICE VISIT (OUTPATIENT)
Dept: URGENT CARE | Facility: CLINIC | Age: 31
End: 2021-01-06
Payer: OTHER MISCELLANEOUS

## 2021-01-06 VITALS
TEMPERATURE: 99 F | WEIGHT: 163 LBS | DIASTOLIC BLOOD PRESSURE: 74 MMHG | HEIGHT: 66 IN | OXYGEN SATURATION: 100 % | SYSTOLIC BLOOD PRESSURE: 134 MMHG | HEART RATE: 83 BPM | RESPIRATION RATE: 16 BRPM | BODY MASS INDEX: 26.2 KG/M2

## 2021-01-06 DIAGNOSIS — S29.019D THORACIC MYOFASCIAL STRAIN, SUBSEQUENT ENCOUNTER: Primary | ICD-10-CM
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
## 2021-01-06 PROCEDURE — 99214 PR OFFICE/OUTPT VISIT, EST, LEVL IV, 30-39 MIN: ICD-10-PCS | Mod: S$GLB,,, | Performed by: NURSE PRACTITIONER

## 2021-01-06 PROCEDURE — 99214 OFFICE O/P EST MOD 30 MIN: CPT | Mod: S$GLB,,, | Performed by: NURSE PRACTITIONER

## 2021-01-06 RX ORDER — TIZANIDINE 4 MG/1
4 TABLET ORAL EVERY 6 HOURS PRN
Qty: 20 TABLET | Refills: 0 | Status: SHIPPED | OUTPATIENT
Start: 2021-01-06 | End: 2021-01-16

## 2021-01-12 ENCOUNTER — OFFICE VISIT (OUTPATIENT)
Dept: URGENT CARE | Facility: CLINIC | Age: 31
End: 2021-01-12
Payer: OTHER MISCELLANEOUS

## 2021-01-12 VITALS
RESPIRATION RATE: 18 BRPM | TEMPERATURE: 98 F | HEIGHT: 66 IN | HEART RATE: 70 BPM | WEIGHT: 164 LBS | BODY MASS INDEX: 26.36 KG/M2 | DIASTOLIC BLOOD PRESSURE: 97 MMHG | SYSTOLIC BLOOD PRESSURE: 149 MMHG
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
## 2021-01-12 DIAGNOSIS — S16.1XXD CERVICAL STRAIN, ACUTE, SUBSEQUENT ENCOUNTER: Primary | ICD-10-CM

## 2021-01-12 PROCEDURE — 99213 PR OFFICE/OUTPT VISIT, EST, LEVL III, 20-29 MIN: ICD-10-PCS | Mod: S$GLB,,, | Performed by: NURSE PRACTITIONER

## 2021-01-12 PROCEDURE — 99213 OFFICE O/P EST LOW 20 MIN: CPT | Mod: S$GLB,,, | Performed by: NURSE PRACTITIONER

## 2021-01-12 PROCEDURE — 72052 X-RAY EXAM NECK SPINE 6/>VWS: CPT | Mod: TC,S$GLB,, | Performed by: EMERGENCY MEDICINE

## 2021-01-12 PROCEDURE — 72052: ICD-10-PCS | Mod: TC,S$GLB,, | Performed by: EMERGENCY MEDICINE

## 2021-01-12 RX ORDER — DICLOFENAC SODIUM 50 MG/1
50 TABLET, DELAYED RELEASE ORAL 2 TIMES DAILY
Qty: 20 TABLET | Refills: 0 | Status: SHIPPED | OUTPATIENT
Start: 2021-01-12 | End: 2021-01-22

## 2021-09-08 ENCOUNTER — LAB VISIT (OUTPATIENT)
Dept: PRIMARY CARE CLINIC | Facility: OTHER | Age: 31
End: 2021-09-08
Payer: MEDICAID

## 2021-09-08 DIAGNOSIS — R05.9 COUGH: ICD-10-CM

## 2021-09-08 PROCEDURE — U0003 INFECTIOUS AGENT DETECTION BY NUCLEIC ACID (DNA OR RNA); SEVERE ACUTE RESPIRATORY SYNDROME CORONAVIRUS 2 (SARS-COV-2) (CORONAVIRUS DISEASE [COVID-19]), AMPLIFIED PROBE TECHNIQUE, MAKING USE OF HIGH THROUGHPUT TECHNOLOGIES AS DESCRIBED BY CMS-2020-01-R: HCPCS | Performed by: FAMILY MEDICINE

## 2021-09-09 LAB
SARS-COV-2 RNA RESP QL NAA+PROBE: NOT DETECTED
SARS-COV-2- CYCLE NUMBER: NORMAL

## 2022-01-18 ENCOUNTER — LAB VISIT (OUTPATIENT)
Dept: PRIMARY CARE CLINIC | Facility: OTHER | Age: 32
End: 2022-01-18
Payer: MEDICAID

## 2022-01-18 DIAGNOSIS — Z20.822 ENCOUNTER FOR LABORATORY TESTING FOR COVID-19 VIRUS: ICD-10-CM

## 2022-01-18 PROCEDURE — U0003 INFECTIOUS AGENT DETECTION BY NUCLEIC ACID (DNA OR RNA); SEVERE ACUTE RESPIRATORY SYNDROME CORONAVIRUS 2 (SARS-COV-2) (CORONAVIRUS DISEASE [COVID-19]), AMPLIFIED PROBE TECHNIQUE, MAKING USE OF HIGH THROUGHPUT TECHNOLOGIES AS DESCRIBED BY CMS-2020-01-R: HCPCS | Performed by: FAMILY MEDICINE

## 2022-01-19 LAB
SARS-COV-2 RNA RESP QL NAA+PROBE: DETECTED
SARS-COV-2- CYCLE NUMBER: 25